# Patient Record
Sex: MALE | Race: BLACK OR AFRICAN AMERICAN | Employment: OTHER | URBAN - METROPOLITAN AREA
[De-identification: names, ages, dates, MRNs, and addresses within clinical notes are randomized per-mention and may not be internally consistent; named-entity substitution may affect disease eponyms.]

---

## 2018-09-25 NOTE — H&P (VIEW-ONLY)
Bertin Nowak 1960 Assessment: ICD-10-CM ICD-9-CM 1. Prostate cancer (HCC) C61 185 AMB POC URINALYSIS DIP STICK AUTO W/O MICRO 1. Intermediate Risk Prostate Cancer- [id number] Tommy 7 (4+3) in 7/12 cores involving 10-60% of each specimen - dx 6/15/18. Presenting PSA - 6.8 ng/mL Bone scan and CT A/P 7/18/18: Negative for any metastasis. TRUS Vol 22.6 grams. PLAN: 
Long discussion on post op RALP expectations in regards to incontinence and ED Discussed importance of performing pelvic floor physical therapy exercises post operatively, has appointment with PT today Rx for Revatio 20 mg with instructions to take two tabs (40 mg) each night for pre op and post op penile rehabilitation. All patient's questions were answered regarding upcoming surgery and post-op side effects. Follow up after surgery will consider FAY therapy at that time at 4 week bobby. DISCUSSION: 
Discussed SEs of prostatectomy, including difficulty with ED and worsening urinary symptoms. Discussed etiology of increased LUTS and ED. Treatment options of ED discussed to include: oral PDE5i meds, FAY, ICI, and IPP. Risks and benefits of each explained to patient. Discussed that medications and PFPT can help patient resolve LUTS 2/2 surgery. Chief Complaint Patient presents with  Prostate Cancer  Erectile Dysfunction HISTORY OF PRESENT ILLNESS: Bertin Nowak is a 60 y.o. male who presents today for recently diagnosed adenocarcinoma of the prostate. Patient here today with his wife to discuss SE of radical prostatectomy. EP of Dr. Akira Davidson. S/p TRUS bx 6/15/18 showing Med Grade [id number] Tommy 7 (4+3) in 7/12 cores involving 10-60% of each specimen. Presenting PSA 6.8ng/ml and TRUS Vol 22.6 grams. Patient was last seen in the office on 6/29/18 to review pathology.  In the interim, patient had staging Ct A/P Wo Cont & Nm whole body bone scan on 7/18/18. Both imaging studies were negative for any evidence for metastatic disease. Prior to prostatectomy: no bothersome urinary troubles, no heart attack/stroke, no new health concerns, HTN, mild ED. Not currently on any PDE5i's. Pt has seen PFPT in anticipation of future prostatectomy. Social: works at [employer] AUA-IPSS: 
AUA Symptom Score 9/25/2018 Over the past month how often have you had the sensation that your bladder was not completely empty after you finished urinating? 0 Over the past month, how often have had to urinate again less than 2 hours after you last finished urinating? 0 Over the past month, how often have you found you stopped and started again several times when you urinated? 0 Over the past month, how often have you found it difficult to postpone urination? 0 Over the past month, how often have you had a weak urinary stream? 0 Over the past month, how often have you had to push or strain to begin urinating? 0 Over the past month, how many times did you most typically get up to urinate from the time you went to bed at night until the time you got up in the morning? 2  
AUA Score 2 If you were to spend the rest of your life with your urinary condition the way it is now, how would you feel about that? Mostly satisfied AUA Assessment Score: 
AUA Score: 2;   
AUA Bother Rating: Mostly satisfied Past Medical History:  
Diagnosis Date  Arthritis  Eczema  ED (erectile dysfunction)  Elevated PSA  Essential hypertension  Gout  Prostate cancer ([facility] [address]  Renal colic History reviewed. No pertinent surgical history. Family History Problem Relation Age of Onset  Cancer Mother  Hypertension Sister  Cancer Sister  Hypertension Brother Current Outpatient Prescriptions Medication Sig Dispense Refill  sildenafil, antihypertensive, (REVATIO) 20 mg tablet Take 1-5 po as needed 90 Tab 6  amLODIPine (NORVASC) 10 mg tablet Take 10 mg by mouth daily.  cloNIDine HCl (CATAPRES) 0.1 mg tablet Take 0.1 mg by mouth two (2) times a day.  etodolac (LODINE) 400 mg tablet Take 400 mg by mouth two (2) times a day.  atenolol (TENORMIN) 50 mg tablet Take 50 mg by mouth daily.  aspirin 81 mg chewable tablet Take 81 mg by mouth daily.  colchicine (MITIGARE) 0.6 mg capsule Take one tablet by mouth when you return home. 1 Cap 0  
 HYDROcodone-acetaminophen (NORCO) 5-325 mg per tablet Take 1 Tab by mouth every four (4) hours as needed for Pain. Max Daily Amount: 6 Tabs. 6 Tab 0 Allergies: 
No Known Allergies Social History Social History  Marital status:  Spouse name: N/A  
 Number of children: N/A  
 Years of education: N/A Occupational History  Not on file. Social History Main Topics  Smoking status: Former Smoker Packs/day: 0.50 Years: 25.00 Quit date: 1/1/2013  Smokeless tobacco: Never Used  Alcohol use 9.6 oz/week 16 Glasses of wine per week  Drug use: No  
 Sexual activity: Not on file Other Topics Concern  Not on file Social History Narrative Review of Systems Constitutional: Fever: No 
Skin: Rash: No 
HEENT: Hearing difficulty: No 
Eyes: Blurred vision: No 
Cardiovascular: Chest pain: No 
Respiratory: Shortness of breath: No 
Gastrointestinal: Nausea/vomiting: No 
Musculoskeletal: Back pain: No 
Neurological: Weakness: No 
Psychological: Memory loss: No 
Comments/additional findings:  
 
 
PE: 
Visit Vitals  /80  Ht 5' 9\" (1.753 m)  Wt 184 lb (83.5 kg)  BMI 27.17 kg/m2 Constitutional: WDWN, Pleasant and appropriate affect, No acute distress. CV:  No peripheral swelling noted Respiratory: No respiratory distress or difficulties Skin: No jaundice. Neuro/Psych:  Alert and oriented x 3. Affect appropriate. Lymphatic:   No enlarged inguinal lymph nodes. Musc: No LE edema. REVIEW OF LABS AND IMAGING:   
 
Results for orders placed or performed in visit on 09/25/18 AMB POC URINALYSIS DIP STICK AUTO W/O MICRO Result Value Ref Range Color (UA POC) Yellow Clarity (UA POC) Clear Glucose (UA POC) Negative Negative Bilirubin (UA POC) 2+ Negative Ketones (UA POC) Negative Negative Specific gravity (UA POC) 1.020 1.001 - 1.035 Blood (UA POC) Negative Negative pH (UA POC) 6.0 4.6 - 8.0 Protein (UA POC) Negative Negative Urobilinogen (UA POC) 0.2 mg/dL 0.2 - 1 Nitrites (UA POC) Negative Negative Leukocyte esterase (UA POC) Negative Negative Lab Results Component Value Date/Time  
 Prostate Specific Ag 6.85 (H) 04/03/2018 09:53 AM  
 Prostate Specific Ag 4.49 (H) 07/28/2017 10:35 AM  
 
 
Prostate Bx Pathology 6/15/18 DIAGNOSIS: 
Right [place] A. Needle biopsy ADENOCARCINOMA, TOMMY SCORE 3 + 3 = 6 involving 10 % of the specimen (1 of 1 core(s) positive). Grade Group 1. Ends not involved by the tumor. B. Right Mid Needle biopsy Benign prostatic tissue. C. Right Base Needle biopsy Benign prostatic tissue. D. Right Lateral [place] Needle biopsy ATYPICAL SMALL ACINAR PROLIFERATION. E. Right Lateral Mid Needle biopsy ATYPICAL SMALL ACINAR PROLIFERATION. F. Right Lateral Base Needle biopsy Benign prostatic tissue. G. Left [place] Needle biopsy ADENOCARCINOMA, TOMMY SCORE 3 + 4 = 7 involving 30 % of the specimen, discontinuous (1 of 1 core(s) positive). [place] 4 comprises 5 % of the cancer. Grade Group 2. Ends not involved by the tumor. Perineural invasion. H. Left Mid Needle biopsy ADENOCARCINOMA, TOMMY SCORE 3 + 4 = 7 involving 30 % of the specimen, discontinuous (1 of 1 core(s) positive). [place] 4 comprises 20 % of the cancer. Grade Group 2. Ends not involved by the tumor. I. Left Base Needle biopsy ADENOCARCINOMA, TOMMY SCORE 4 + 3 = 7 involving 60 % of the specimen (1 of 1 core(s) positive). Tommy 4 comprises 70 % of the cancer. Grade Group 3. Ends not involved by the tumor. Perineural invasion. J. Left Lateral [place] Needle biopsy ADENOCARCINOMA, TOMMY SCORE 3 + 3 = 6 involving 10 % of the specimen (1 of 1 core(s) positive). Grade Group 1. Ends not involved by the tumor. K. Left Lateral Mid Needle biopsy ADENOCARCINOMA, TOMMY SCORE 4 + 3 = 7 involving 20 % of the specimen (1 of 1 core(s) positive). [place] 4 comprises 70 % of the cancer. Grade Group 3. Ends not involved by the tumor. L. Left Lateral Base Needle biopsy ADENOCARCINOMA, TOMMY SCORE 4 + 3 = 7 involving 60 % of the specimen, discontinuous (1 of 1 core(s) positive). [place] 4 comprises 70 % of the cancer. Grade Group 3. Ends not involved by the tumor. CT A/P W Cont 7/18/18 Kidneys: Negative. Lymph nodes: Not pathologic in size or number. CT PELVIS FINDINGS:  
Bladder: Negative. Osseous structures of the Abdomen and Pelvis: Normal for age. IMPRESSION Impression:  
There is no convincing CT evidence of metastatic disease. NM Bone Scan Wh Body 7/18/18 FINDINGS:  
Mild increased focal activity at the left maxillary region, possibly odontogenic, focal left maxillary sinusitis also conceivable. Mild increased activity at the right greater than left T10 costovertebral junctions, which in reference to CT corresponds to degenerative changes. There is moderately increased radiotracer activity at the left hip, corresponding to degenerative osteoarthrosis on CT. Sites of likely arthritic/degenerative activity at the bilateral costochondral junctions, right wrist, left hand, left knee, left ankle, and right greater than left first MTP joints. There is no focal abnormal radiopharmaceutical localization suspicious for osseous metastatic disease. There is expected soft tissue radiotracer activity in the kidneys and urinary bladder. 
  
IMPRESSION Impression: 
Findings as described above, without definite evidence of osseous metastatic disease. A copy of today's office visit with all pertinent imaging results and labs were sent to the referring MD Akira Landaverde. Jeff Henley MD  
Urology of [facility] 
[address] Medical documentation is provided with the assistance of Pura Olivo. Marychuy De Santiago medical scribe for Jaxon Correia MD on 9/25/2018  
 
30 min were spent in the patient's care, > 50% in counseling and coordination of care

## 2018-10-15 ENCOUNTER — HOSPITAL ENCOUNTER (OUTPATIENT)
Dept: PREADMISSION TESTING | Age: 58
Discharge: HOME | End: 2018-10-15
Payer: COMMERCIAL

## 2018-10-15 DIAGNOSIS — C61 MALIGNANT NEOPLASM OF PROSTATE (HCC): ICD-10-CM

## 2018-10-15 LAB
ABO + RH BLD: NORMAL
ANION GAP SERPL CALC-SCNC: 8 MMOL/L (ref 3–18)
APPEARANCE UR: CLEAR
APTT PPP: 31.8 SEC (ref 23–36.4)
ATRIAL RATE: 64 BPM
BASOPHILS # BLD: 0.1 K/UL (ref 0–0.1)
BASOPHILS NFR BLD: 1 % (ref 0–2)
BILIRUB UR QL: NEGATIVE
BLOOD GROUP ANTIBODIES SERPL: NORMAL
BUN SERPL-MCNC: 12 MG/DL (ref 7–18)
BUN/CREAT SERPL: 13 (ref 12–20)
CALCIUM SERPL-MCNC: 8.7 MG/DL (ref 8.5–10.1)
CALCULATED P AXIS, ECG09: 59 DEGREES
CALCULATED R AXIS, ECG10: 45 DEGREES
CALCULATED T AXIS, ECG11: 43 DEGREES
CHLORIDE SERPL-SCNC: 103 MMOL/L (ref 100–108)
CO2 SERPL-SCNC: 29 MMOL/L (ref 21–32)
COLOR UR: YELLOW
CREAT SERPL-MCNC: 0.91 MG/DL (ref 0.6–1.3)
DIAGNOSIS, 93000: NORMAL
DIFFERENTIAL METHOD BLD: ABNORMAL
EOSINOPHIL # BLD: 0.1 K/UL (ref 0–0.4)
EOSINOPHIL NFR BLD: 1 % (ref 0–5)
ERYTHROCYTE [DISTWIDTH] IN BLOOD BY AUTOMATED COUNT: 13.7 % (ref 11.6–14.5)
GLUCOSE SERPL-MCNC: 92 MG/DL (ref 74–99)
GLUCOSE UR STRIP.AUTO-MCNC: NEGATIVE MG/DL
HCT VFR BLD AUTO: 40.4 % (ref 36–48)
HGB BLD-MCNC: 13.9 G/DL (ref 13–16)
HGB UR QL STRIP: NEGATIVE
INR PPP: 1 (ref 0.8–1.2)
KETONES UR QL STRIP.AUTO: NEGATIVE MG/DL
LEUKOCYTE ESTERASE UR QL STRIP.AUTO: NEGATIVE
LYMPHOCYTES # BLD: 1.5 K/UL (ref 0.9–3.6)
LYMPHOCYTES NFR BLD: 29 % (ref 21–52)
MCH RBC QN AUTO: 30.5 PG (ref 24–34)
MCHC RBC AUTO-ENTMCNC: 34.4 G/DL (ref 31–37)
MCV RBC AUTO: 88.6 FL (ref 74–97)
MONOCYTES # BLD: 0.3 K/UL (ref 0.05–1.2)
MONOCYTES NFR BLD: 5 % (ref 3–10)
NEUTS SEG # BLD: 3.3 K/UL (ref 1.8–8)
NEUTS SEG NFR BLD: 64 % (ref 40–73)
NITRITE UR QL STRIP.AUTO: NEGATIVE
P-R INTERVAL, ECG05: 164 MS
PH UR STRIP: 6 [PH] (ref 5–8)
PLATELET # BLD AUTO: 284 K/UL (ref 135–420)
PMV BLD AUTO: 9.6 FL (ref 9.2–11.8)
POTASSIUM SERPL-SCNC: 3.8 MMOL/L (ref 3.5–5.5)
PROT UR STRIP-MCNC: NEGATIVE MG/DL
PROTHROMBIN TIME: 13.2 SEC (ref 11.5–15.2)
Q-T INTERVAL, ECG07: 416 MS
QRS DURATION, ECG06: 92 MS
QTC CALCULATION (BEZET), ECG08: 429 MS
RBC # BLD AUTO: 4.56 M/UL (ref 4.7–5.5)
SODIUM SERPL-SCNC: 140 MMOL/L (ref 136–145)
SP GR UR REFRACTOMETRY: 1.01 (ref 1–1.03)
SPECIMEN EXP DATE BLD: NORMAL
UROBILINOGEN UR QL STRIP.AUTO: 0.2 EU/DL (ref 0.2–1)
VENTRICULAR RATE, ECG03: 64 BPM
WBC # BLD AUTO: 5.2 K/UL (ref 4.6–13.2)

## 2018-10-15 PROCEDURE — 80048 BASIC METABOLIC PNL TOTAL CA: CPT | Performed by: UROLOGY

## 2018-10-15 PROCEDURE — 85610 PROTHROMBIN TIME: CPT | Performed by: UROLOGY

## 2018-10-15 PROCEDURE — 85025 COMPLETE CBC W/AUTO DIFF WBC: CPT | Performed by: UROLOGY

## 2018-10-15 PROCEDURE — 93005 ELECTROCARDIOGRAM TRACING: CPT

## 2018-10-15 PROCEDURE — 87086 URINE CULTURE/COLONY COUNT: CPT | Performed by: UROLOGY

## 2018-10-15 PROCEDURE — 85730 THROMBOPLASTIN TIME PARTIAL: CPT | Performed by: UROLOGY

## 2018-10-15 PROCEDURE — 81003 URINALYSIS AUTO W/O SCOPE: CPT | Performed by: UROLOGY

## 2018-10-15 PROCEDURE — 86900 BLOOD TYPING SEROLOGIC ABO: CPT | Performed by: UROLOGY

## 2018-10-15 PROCEDURE — 36415 COLL VENOUS BLD VENIPUNCTURE: CPT | Performed by: UROLOGY

## 2018-10-16 LAB
BACTERIA SPEC CULT: NORMAL
SERVICE CMNT-IMP: NORMAL

## 2018-10-23 RX ORDER — CEFAZOLIN SODIUM 2 G/50ML
2 SOLUTION INTRAVENOUS ONCE
Status: CANCELLED | OUTPATIENT
Start: 2018-10-25 | End: 2018-10-25

## 2018-10-23 RX ORDER — HEPARIN SODIUM 5000 [USP'U]/ML
5000 INJECTION, SOLUTION INTRAVENOUS; SUBCUTANEOUS ONCE
Status: CANCELLED | OUTPATIENT
Start: 2018-10-25 | End: 2018-10-25

## 2018-10-24 ENCOUNTER — ANESTHESIA EVENT (OUTPATIENT)
Dept: SURGERY | Age: 58
DRG: 707 | End: 2018-10-24
Payer: COMMERCIAL

## 2018-10-25 ENCOUNTER — ANESTHESIA (OUTPATIENT)
Dept: SURGERY | Age: 58
DRG: 707 | End: 2018-10-25
Payer: COMMERCIAL

## 2018-10-25 ENCOUNTER — HOSPITAL ENCOUNTER (INPATIENT)
Age: 58
LOS: 4 days | Discharge: HOME | DRG: 707 | End: 2018-10-29
Attending: UROLOGY | Admitting: UROLOGY
Payer: COMMERCIAL

## 2018-10-25 DIAGNOSIS — C61 MALIGNANT NEOPLASM OF PROSTATE (HCC): Primary | ICD-10-CM

## 2018-10-25 PROCEDURE — 77030014647 HC SEAL FBRN TISSL BAXT -D: Performed by: UROLOGY

## 2018-10-25 PROCEDURE — 77030027138 HC INCENT SPIROMETER -A

## 2018-10-25 PROCEDURE — 77030010939 HC CLP LIG TELE -B: Performed by: UROLOGY

## 2018-10-25 PROCEDURE — 76010000880 HC OR TIME 4 TO 4.5HR INTENSV - TIER 2: Performed by: UROLOGY

## 2018-10-25 PROCEDURE — 74011250637 HC RX REV CODE- 250/637: Performed by: UROLOGY

## 2018-10-25 PROCEDURE — 0VT34ZZ RESECTION OF BILATERAL SEMINAL VESICLES, PERCUTANEOUS ENDOSCOPIC APPROACH: ICD-10-PCS | Performed by: UROLOGY

## 2018-10-25 PROCEDURE — 74011250636 HC RX REV CODE- 250/636: Performed by: NURSE ANESTHETIST, CERTIFIED REGISTERED

## 2018-10-25 PROCEDURE — 74011000250 HC RX REV CODE- 250: Performed by: UROLOGY

## 2018-10-25 PROCEDURE — 74011000272 HC RX REV CODE- 272: Performed by: UROLOGY

## 2018-10-25 PROCEDURE — 0VT04ZZ RESECTION OF PROSTATE, PERCUTANEOUS ENDOSCOPIC APPROACH: ICD-10-PCS | Performed by: UROLOGY

## 2018-10-25 PROCEDURE — 65270000029 HC RM PRIVATE

## 2018-10-25 PROCEDURE — 74011000250 HC RX REV CODE- 250

## 2018-10-25 PROCEDURE — 77030008756 HC TU IRR SUC STRY -B: Performed by: UROLOGY

## 2018-10-25 PROCEDURE — 0VTQ4ZZ RESECTION OF BILATERAL VAS DEFERENS, PERCUTANEOUS ENDOSCOPIC APPROACH: ICD-10-PCS | Performed by: UROLOGY

## 2018-10-25 PROCEDURE — 07BC4ZX EXCISION OF PELVIS LYMPHATIC, PERCUTANEOUS ENDOSCOPIC APPROACH, DIAGNOSTIC: ICD-10-PCS | Performed by: UROLOGY

## 2018-10-25 PROCEDURE — 77030013079 HC BLNKT BAIR HGGR 3M -A: Performed by: ANESTHESIOLOGY

## 2018-10-25 PROCEDURE — 77030008683 HC TU ET CUF COVD -A: Performed by: ANESTHESIOLOGY

## 2018-10-25 PROCEDURE — 74011250636 HC RX REV CODE- 250/636

## 2018-10-25 PROCEDURE — 74011250636 HC RX REV CODE- 250/636: Performed by: UROLOGY

## 2018-10-25 PROCEDURE — 77030020263 HC SOL INJ SOD CL0.9% LFCR 1000ML: Performed by: UROLOGY

## 2018-10-25 PROCEDURE — 77030034696 HC CATH URETH FOL 2W BARD -A: Performed by: UROLOGY

## 2018-10-25 PROCEDURE — 77030037892: Performed by: UROLOGY

## 2018-10-25 PROCEDURE — 77030034850: Performed by: UROLOGY

## 2018-10-25 PROCEDURE — 88307 TISSUE EXAM BY PATHOLOGIST: CPT | Performed by: UROLOGY

## 2018-10-25 PROCEDURE — 77030018846 HC SOL IRR STRL H20 ICUM -A: Performed by: UROLOGY

## 2018-10-25 PROCEDURE — 77030032490 HC SLV COMPR SCD KNE COVD -B: Performed by: UROLOGY

## 2018-10-25 PROCEDURE — 99218 HC RM OBSERVATION: CPT

## 2018-10-25 PROCEDURE — 77030039266 HC ADH SKN EXOFIN S2SG -A: Performed by: UROLOGY

## 2018-10-25 PROCEDURE — 77030016151 HC PROTCTR LNS DFOG COVD -B: Performed by: UROLOGY

## 2018-10-25 PROCEDURE — 77030022704 HC SUT VLOC COVD -B: Performed by: UROLOGY

## 2018-10-25 PROCEDURE — 77030026438 HC STYL ET INTUB CARD -A: Performed by: ANESTHESIOLOGY

## 2018-10-25 PROCEDURE — 76210000006 HC OR PH I REC 0.5 TO 1 HR: Performed by: UROLOGY

## 2018-10-25 PROCEDURE — 88309 TISSUE EXAM BY PATHOLOGIST: CPT | Performed by: UROLOGY

## 2018-10-25 PROCEDURE — 77030009848 HC PASSR SUT SET COOP -C: Performed by: UROLOGY

## 2018-10-25 PROCEDURE — 77030002933 HC SUT MCRYL J&J -A: Performed by: UROLOGY

## 2018-10-25 PROCEDURE — 77030031139 HC SUT VCRL2 J&J -A: Performed by: UROLOGY

## 2018-10-25 PROCEDURE — 77030019605: Performed by: UROLOGY

## 2018-10-25 PROCEDURE — 77030008771 HC TU NG SALEM SUMP -A: Performed by: ANESTHESIOLOGY

## 2018-10-25 PROCEDURE — 77030020703 HC SEAL CANN DISP INTU -B: Performed by: UROLOGY

## 2018-10-25 PROCEDURE — 77030010935 HC CLP LIG ABSRB TELE -B: Performed by: UROLOGY

## 2018-10-25 PROCEDURE — 77030020788: Performed by: UROLOGY

## 2018-10-25 PROCEDURE — 74011250637 HC RX REV CODE- 250/637: Performed by: NURSE ANESTHETIST, CERTIFIED REGISTERED

## 2018-10-25 PROCEDURE — 77030010517 HC APPL SEAL FLOSEL BAXT -B: Performed by: UROLOGY

## 2018-10-25 PROCEDURE — 76060000039 HC ANESTHESIA 4 TO 4.5 HR: Performed by: UROLOGY

## 2018-10-25 PROCEDURE — 8E0W4CZ ROBOTIC ASSISTED PROCEDURE OF TRUNK REGION, PERCUTANEOUS ENDOSCOPIC APPROACH: ICD-10-PCS | Performed by: UROLOGY

## 2018-10-25 PROCEDURE — 77030035048 HC TRCR ENDOSC OPTCL COVD -B: Performed by: UROLOGY

## 2018-10-25 PROCEDURE — 77030003028 HC SUT VCRL J&J -A: Performed by: UROLOGY

## 2018-10-25 RX ORDER — DOCUSATE SODIUM 100 MG/1
100 CAPSULE, LIQUID FILLED ORAL 2 TIMES DAILY
Qty: 40 CAP | Refills: 0 | Status: SHIPPED | OUTPATIENT
Start: 2018-10-25 | End: 2018-11-14

## 2018-10-25 RX ORDER — GLYCOPYRROLATE 0.2 MG/ML
INJECTION INTRAMUSCULAR; INTRAVENOUS AS NEEDED
Status: DISCONTINUED | OUTPATIENT
Start: 2018-10-25 | End: 2018-10-25 | Stop reason: HOSPADM

## 2018-10-25 RX ORDER — CLONIDINE HYDROCHLORIDE 0.1 MG/1
0.1 TABLET ORAL 2 TIMES DAILY
Status: DISCONTINUED | OUTPATIENT
Start: 2018-10-25 | End: 2018-10-29 | Stop reason: HOSPADM

## 2018-10-25 RX ORDER — SODIUM CHLORIDE 0.9 % (FLUSH) 0.9 %
5-10 SYRINGE (ML) INJECTION EVERY 8 HOURS
Status: DISCONTINUED | OUTPATIENT
Start: 2018-10-25 | End: 2018-10-29 | Stop reason: HOSPADM

## 2018-10-25 RX ORDER — FAMOTIDINE 20 MG/1
20 TABLET, FILM COATED ORAL ONCE
Status: COMPLETED | OUTPATIENT
Start: 2018-10-25 | End: 2018-10-25

## 2018-10-25 RX ORDER — EPHEDRINE SULFATE 50 MG/ML
INJECTION, SOLUTION INTRAVENOUS AS NEEDED
Status: DISCONTINUED | OUTPATIENT
Start: 2018-10-25 | End: 2018-10-25 | Stop reason: HOSPADM

## 2018-10-25 RX ORDER — NALOXONE HYDROCHLORIDE 0.4 MG/ML
0.1 INJECTION, SOLUTION INTRAMUSCULAR; INTRAVENOUS; SUBCUTANEOUS ONCE
Status: DISCONTINUED | OUTPATIENT
Start: 2018-10-25 | End: 2018-10-25 | Stop reason: HOSPADM

## 2018-10-25 RX ORDER — BUPIVACAINE HYDROCHLORIDE 2.5 MG/ML
INJECTION, SOLUTION EPIDURAL; INFILTRATION; INTRACAUDAL AS NEEDED
Status: DISCONTINUED | OUTPATIENT
Start: 2018-10-25 | End: 2018-10-25 | Stop reason: HOSPADM

## 2018-10-25 RX ORDER — LIDOCAINE HYDROCHLORIDE 20 MG/ML
INJECTION, SOLUTION EPIDURAL; INFILTRATION; INTRACAUDAL; PERINEURAL AS NEEDED
Status: DISCONTINUED | OUTPATIENT
Start: 2018-10-25 | End: 2018-10-25 | Stop reason: HOSPADM

## 2018-10-25 RX ORDER — ENOXAPARIN SODIUM 100 MG/ML
40 INJECTION SUBCUTANEOUS DAILY
Qty: 26 SYRINGE | Refills: 0 | Status: ON HOLD | OUTPATIENT
Start: 2018-10-25 | End: 2021-09-15 | Stop reason: CLARIF

## 2018-10-25 RX ORDER — SODIUM CHLORIDE 0.9 % (FLUSH) 0.9 %
5-10 SYRINGE (ML) INJECTION AS NEEDED
Status: DISCONTINUED | OUTPATIENT
Start: 2018-10-25 | End: 2018-10-29 | Stop reason: HOSPADM

## 2018-10-25 RX ORDER — AMLODIPINE BESYLATE 10 MG/1
10 TABLET ORAL DAILY
Status: DISCONTINUED | OUTPATIENT
Start: 2018-10-26 | End: 2018-10-29 | Stop reason: HOSPADM

## 2018-10-25 RX ORDER — ONDANSETRON 2 MG/ML
INJECTION INTRAMUSCULAR; INTRAVENOUS AS NEEDED
Status: DISCONTINUED | OUTPATIENT
Start: 2018-10-25 | End: 2018-10-25 | Stop reason: HOSPADM

## 2018-10-25 RX ORDER — SODIUM CHLORIDE 0.9 % (FLUSH) 0.9 %
5-10 SYRINGE (ML) INJECTION AS NEEDED
Status: CANCELLED | OUTPATIENT
Start: 2018-10-25

## 2018-10-25 RX ORDER — SODIUM CHLORIDE, SODIUM LACTATE, POTASSIUM CHLORIDE, CALCIUM CHLORIDE 600; 310; 30; 20 MG/100ML; MG/100ML; MG/100ML; MG/100ML
75 INJECTION, SOLUTION INTRAVENOUS CONTINUOUS
Status: DISCONTINUED | OUTPATIENT
Start: 2018-10-25 | End: 2018-10-25 | Stop reason: HOSPADM

## 2018-10-25 RX ORDER — SODIUM CHLORIDE 9 MG/ML
INJECTION, SOLUTION INTRAVENOUS
Status: DISCONTINUED | OUTPATIENT
Start: 2018-10-25 | End: 2018-10-25 | Stop reason: HOSPADM

## 2018-10-25 RX ORDER — ONDANSETRON 2 MG/ML
4 INJECTION INTRAMUSCULAR; INTRAVENOUS
Status: DISCONTINUED | OUTPATIENT
Start: 2018-10-25 | End: 2018-10-29 | Stop reason: HOSPADM

## 2018-10-25 RX ORDER — PROPOFOL 10 MG/ML
INJECTION, EMULSION INTRAVENOUS AS NEEDED
Status: DISCONTINUED | OUTPATIENT
Start: 2018-10-25 | End: 2018-10-25 | Stop reason: HOSPADM

## 2018-10-25 RX ORDER — FENTANYL CITRATE 50 UG/ML
INJECTION, SOLUTION INTRAMUSCULAR; INTRAVENOUS AS NEEDED
Status: DISCONTINUED | OUTPATIENT
Start: 2018-10-25 | End: 2018-10-25 | Stop reason: HOSPADM

## 2018-10-25 RX ORDER — ACETAMINOPHEN 325 MG/1
650 TABLET ORAL
Status: DISCONTINUED | OUTPATIENT
Start: 2018-10-25 | End: 2018-10-29 | Stop reason: HOSPADM

## 2018-10-25 RX ORDER — HYDROMORPHONE HYDROCHLORIDE 2 MG/ML
0.5 INJECTION, SOLUTION INTRAMUSCULAR; INTRAVENOUS; SUBCUTANEOUS AS NEEDED
Status: COMPLETED | OUTPATIENT
Start: 2018-10-25 | End: 2018-10-25

## 2018-10-25 RX ORDER — MAGNESIUM SULFATE 100 %
4 CRYSTALS MISCELLANEOUS AS NEEDED
Status: CANCELLED | OUTPATIENT
Start: 2018-10-25

## 2018-10-25 RX ORDER — SODIUM CHLORIDE 9 MG/ML
125 INJECTION, SOLUTION INTRAVENOUS CONTINUOUS
Status: DISCONTINUED | OUTPATIENT
Start: 2018-10-25 | End: 2018-10-26

## 2018-10-25 RX ORDER — GUAIFENESIN 100 MG/5ML
81 LIQUID (ML) ORAL DAILY
Status: DISCONTINUED | OUTPATIENT
Start: 2018-10-26 | End: 2018-10-29 | Stop reason: HOSPADM

## 2018-10-25 RX ORDER — NALOXONE HYDROCHLORIDE 0.4 MG/ML
INJECTION, SOLUTION INTRAMUSCULAR; INTRAVENOUS; SUBCUTANEOUS AS NEEDED
Status: CANCELLED | OUTPATIENT
Start: 2018-10-25

## 2018-10-25 RX ORDER — SODIUM CHLORIDE, SODIUM LACTATE, POTASSIUM CHLORIDE, CALCIUM CHLORIDE 600; 310; 30; 20 MG/100ML; MG/100ML; MG/100ML; MG/100ML
50 INJECTION, SOLUTION INTRAVENOUS CONTINUOUS
Status: DISPENSED | OUTPATIENT
Start: 2018-10-25 | End: 2018-10-26

## 2018-10-25 RX ORDER — OXYCODONE AND ACETAMINOPHEN 5; 325 MG/1; MG/1
1-2 TABLET ORAL
Qty: 20 TAB | Refills: 0 | Status: SHIPPED | OUTPATIENT
Start: 2018-10-25 | End: 2018-12-04

## 2018-10-25 RX ORDER — NEOSTIGMINE METHYLSULFATE 5 MG/5 ML
SYRINGE (ML) INTRAVENOUS AS NEEDED
Status: DISCONTINUED | OUTPATIENT
Start: 2018-10-25 | End: 2018-10-25 | Stop reason: HOSPADM

## 2018-10-25 RX ORDER — HEPARIN SODIUM 5000 [USP'U]/ML
5000 INJECTION, SOLUTION INTRAVENOUS; SUBCUTANEOUS EVERY 8 HOURS
Status: DISCONTINUED | OUTPATIENT
Start: 2018-10-25 | End: 2018-10-29 | Stop reason: HOSPADM

## 2018-10-25 RX ORDER — MIDAZOLAM HYDROCHLORIDE 1 MG/ML
INJECTION, SOLUTION INTRAMUSCULAR; INTRAVENOUS AS NEEDED
Status: DISCONTINUED | OUTPATIENT
Start: 2018-10-25 | End: 2018-10-25 | Stop reason: HOSPADM

## 2018-10-25 RX ORDER — OXYCODONE AND ACETAMINOPHEN 5; 325 MG/1; MG/1
1 TABLET ORAL
Status: DISCONTINUED | OUTPATIENT
Start: 2018-10-25 | End: 2018-10-29 | Stop reason: HOSPADM

## 2018-10-25 RX ORDER — OXYBUTYNIN CHLORIDE 5 MG/1
5 TABLET ORAL
Status: DISCONTINUED | OUTPATIENT
Start: 2018-10-25 | End: 2018-10-29 | Stop reason: HOSPADM

## 2018-10-25 RX ORDER — ZOLPIDEM TARTRATE 5 MG/1
5 TABLET ORAL
Status: DISCONTINUED | OUTPATIENT
Start: 2018-10-25 | End: 2018-10-29 | Stop reason: HOSPADM

## 2018-10-25 RX ORDER — CEFAZOLIN SODIUM 2 G/50ML
2 SOLUTION INTRAVENOUS ONCE
Status: COMPLETED | OUTPATIENT
Start: 2018-10-25 | End: 2018-10-25

## 2018-10-25 RX ORDER — DEXTROSE 50 % IN WATER (D50W) INTRAVENOUS SYRINGE
25-50 AS NEEDED
Status: CANCELLED | OUTPATIENT
Start: 2018-10-25

## 2018-10-25 RX ORDER — SODIUM CHLORIDE, SODIUM LACTATE, POTASSIUM CHLORIDE, CALCIUM CHLORIDE 600; 310; 30; 20 MG/100ML; MG/100ML; MG/100ML; MG/100ML
INJECTION, SOLUTION INTRAVENOUS CONTINUOUS
Status: CANCELLED | OUTPATIENT
Start: 2018-10-25

## 2018-10-25 RX ORDER — HEPARIN SODIUM 5000 [USP'U]/ML
5000 INJECTION, SOLUTION INTRAVENOUS; SUBCUTANEOUS ONCE
Status: COMPLETED | OUTPATIENT
Start: 2018-10-25 | End: 2018-10-25

## 2018-10-25 RX ORDER — SODIUM CHLORIDE 0.9 % (FLUSH) 0.9 %
5-10 SYRINGE (ML) INJECTION EVERY 8 HOURS
Status: CANCELLED | OUTPATIENT
Start: 2018-10-25

## 2018-10-25 RX ORDER — DEXAMETHASONE SODIUM PHOSPHATE 4 MG/ML
INJECTION, SOLUTION INTRA-ARTICULAR; INTRALESIONAL; INTRAMUSCULAR; INTRAVENOUS; SOFT TISSUE AS NEEDED
Status: DISCONTINUED | OUTPATIENT
Start: 2018-10-25 | End: 2018-10-25 | Stop reason: HOSPADM

## 2018-10-25 RX ORDER — VECURONIUM BROMIDE FOR INJECTION 1 MG/ML
INJECTION, POWDER, LYOPHILIZED, FOR SOLUTION INTRAVENOUS AS NEEDED
Status: DISCONTINUED | OUTPATIENT
Start: 2018-10-25 | End: 2018-10-25 | Stop reason: HOSPADM

## 2018-10-25 RX ORDER — INSULIN LISPRO 100 [IU]/ML
INJECTION, SOLUTION INTRAVENOUS; SUBCUTANEOUS
Status: CANCELLED | OUTPATIENT
Start: 2018-10-25

## 2018-10-25 RX ORDER — DOCUSATE SODIUM 100 MG/1
100 CAPSULE, LIQUID FILLED ORAL 2 TIMES DAILY
Status: DISCONTINUED | OUTPATIENT
Start: 2018-10-25 | End: 2018-10-29 | Stop reason: HOSPADM

## 2018-10-25 RX ORDER — HYDROMORPHONE HYDROCHLORIDE 1 MG/ML
0.4 INJECTION, SOLUTION INTRAMUSCULAR; INTRAVENOUS; SUBCUTANEOUS
Status: DISCONTINUED | OUTPATIENT
Start: 2018-10-25 | End: 2018-10-29 | Stop reason: HOSPADM

## 2018-10-25 RX ORDER — INSULIN LISPRO 100 [IU]/ML
INJECTION, SOLUTION INTRAVENOUS; SUBCUTANEOUS ONCE
Status: ACTIVE | OUTPATIENT
Start: 2018-10-25 | End: 2018-10-25

## 2018-10-25 RX ORDER — ATENOLOL 50 MG/1
50 TABLET ORAL DAILY
Status: DISCONTINUED | OUTPATIENT
Start: 2018-10-26 | End: 2018-10-29 | Stop reason: HOSPADM

## 2018-10-25 RX ORDER — SUCCINYLCHOLINE CHLORIDE 20 MG/ML
INJECTION INTRAMUSCULAR; INTRAVENOUS AS NEEDED
Status: DISCONTINUED | OUTPATIENT
Start: 2018-10-25 | End: 2018-10-25 | Stop reason: HOSPADM

## 2018-10-25 RX ADMIN — FAMOTIDINE 20 MG: 20 TABLET, FILM COATED ORAL at 06:33

## 2018-10-25 RX ADMIN — HEPARIN SODIUM 5000 UNITS: 5000 INJECTION, SOLUTION INTRAVENOUS; SUBCUTANEOUS at 14:22

## 2018-10-25 RX ADMIN — HYDROMORPHONE HYDROCHLORIDE 0.4 MG: 1 INJECTION, SOLUTION INTRAMUSCULAR; INTRAVENOUS; SUBCUTANEOUS at 14:23

## 2018-10-25 RX ADMIN — VECURONIUM BROMIDE FOR INJECTION 5 MG: 1 INJECTION, POWDER, LYOPHILIZED, FOR SOLUTION INTRAVENOUS at 08:05

## 2018-10-25 RX ADMIN — SODIUM CHLORIDE 125 ML/HR: 900 INJECTION, SOLUTION INTRAVENOUS at 15:27

## 2018-10-25 RX ADMIN — FENTANYL CITRATE 100 MCG: 50 INJECTION, SOLUTION INTRAMUSCULAR; INTRAVENOUS at 07:52

## 2018-10-25 RX ADMIN — PROPOFOL 50 MG: 10 INJECTION, EMULSION INTRAVENOUS at 07:55

## 2018-10-25 RX ADMIN — PROPOFOL 150 MG: 10 INJECTION, EMULSION INTRAVENOUS at 07:53

## 2018-10-25 RX ADMIN — VECURONIUM BROMIDE FOR INJECTION 1 MG: 1 INJECTION, POWDER, LYOPHILIZED, FOR SOLUTION INTRAVENOUS at 10:56

## 2018-10-25 RX ADMIN — EPHEDRINE SULFATE 5 MG: 50 INJECTION, SOLUTION INTRAVENOUS at 10:39

## 2018-10-25 RX ADMIN — SODIUM CHLORIDE, SODIUM LACTATE, POTASSIUM CHLORIDE, AND CALCIUM CHLORIDE 50 ML/HR: 600; 310; 30; 20 INJECTION, SOLUTION INTRAVENOUS at 06:33

## 2018-10-25 RX ADMIN — ONDANSETRON 4 MG: 2 INJECTION INTRAMUSCULAR; INTRAVENOUS at 11:45

## 2018-10-25 RX ADMIN — SODIUM CHLORIDE 125 ML/HR: 900 INJECTION, SOLUTION INTRAVENOUS at 21:05

## 2018-10-25 RX ADMIN — MIDAZOLAM HYDROCHLORIDE 2 MG: 1 INJECTION, SOLUTION INTRAMUSCULAR; INTRAVENOUS at 07:45

## 2018-10-25 RX ADMIN — Medication 10 ML: at 17:39

## 2018-10-25 RX ADMIN — HEPARIN SODIUM 5000 UNITS: 5000 INJECTION, SOLUTION INTRAVENOUS; SUBCUTANEOUS at 06:50

## 2018-10-25 RX ADMIN — VECURONIUM BROMIDE FOR INJECTION 1 MG: 1 INJECTION, POWDER, LYOPHILIZED, FOR SOLUTION INTRAVENOUS at 10:16

## 2018-10-25 RX ADMIN — HYDROMORPHONE HYDROCHLORIDE 0.4 MG: 1 INJECTION, SOLUTION INTRAMUSCULAR; INTRAVENOUS; SUBCUTANEOUS at 18:13

## 2018-10-25 RX ADMIN — HYDROMORPHONE HYDROCHLORIDE 0.5 MG: 2 INJECTION INTRAMUSCULAR; INTRAVENOUS; SUBCUTANEOUS at 12:31

## 2018-10-25 RX ADMIN — VECURONIUM BROMIDE FOR INJECTION 1 MG: 1 INJECTION, POWDER, LYOPHILIZED, FOR SOLUTION INTRAVENOUS at 07:50

## 2018-10-25 RX ADMIN — HYDROMORPHONE HYDROCHLORIDE 0.5 MG: 2 INJECTION INTRAMUSCULAR; INTRAVENOUS; SUBCUTANEOUS at 12:41

## 2018-10-25 RX ADMIN — SODIUM CHLORIDE: 9 INJECTION, SOLUTION INTRAVENOUS at 08:00

## 2018-10-25 RX ADMIN — HEPARIN SODIUM 5000 UNITS: 5000 INJECTION, SOLUTION INTRAVENOUS; SUBCUTANEOUS at 21:07

## 2018-10-25 RX ADMIN — SUCCINYLCHOLINE CHLORIDE 100 MG: 20 INJECTION INTRAMUSCULAR; INTRAVENOUS at 07:54

## 2018-10-25 RX ADMIN — CEFAZOLIN 1 G: 1 INJECTION, POWDER, FOR SOLUTION INTRAMUSCULAR; INTRAVENOUS at 15:26

## 2018-10-25 RX ADMIN — HYDROMORPHONE HYDROCHLORIDE 0.5 MG: 2 INJECTION INTRAMUSCULAR; INTRAVENOUS; SUBCUTANEOUS at 12:36

## 2018-10-25 RX ADMIN — GLYCOPYRROLATE 0.6 MG: 0.2 INJECTION INTRAMUSCULAR; INTRAVENOUS at 11:52

## 2018-10-25 RX ADMIN — HYDROMORPHONE HYDROCHLORIDE 0.5 MG: 2 INJECTION INTRAMUSCULAR; INTRAVENOUS; SUBCUTANEOUS at 12:26

## 2018-10-25 RX ADMIN — LIDOCAINE HYDROCHLORIDE 60 MG: 20 INJECTION, SOLUTION EPIDURAL; INFILTRATION; INTRACAUDAL; PERINEURAL at 07:52

## 2018-10-25 RX ADMIN — DEXAMETHASONE SODIUM PHOSPHATE 8 MG: 4 INJECTION, SOLUTION INTRA-ARTICULAR; INTRALESIONAL; INTRAMUSCULAR; INTRAVENOUS; SOFT TISSUE at 08:45

## 2018-10-25 RX ADMIN — EPHEDRINE SULFATE 5 MG: 50 INJECTION, SOLUTION INTRAVENOUS at 11:40

## 2018-10-25 RX ADMIN — CEFAZOLIN SODIUM 2 G: 2 SOLUTION INTRAVENOUS at 08:14

## 2018-10-25 RX ADMIN — DOCUSATE SODIUM 100 MG: 100 CAPSULE, LIQUID FILLED ORAL at 17:38

## 2018-10-25 RX ADMIN — Medication 3 MG: at 11:52

## 2018-10-25 RX ADMIN — CLONIDINE HYDROCHLORIDE 0.1 MG: 0.1 TABLET ORAL at 17:38

## 2018-10-25 RX ADMIN — Medication 10 ML: at 21:07

## 2018-10-25 RX ADMIN — HYDROMORPHONE HYDROCHLORIDE 0.4 MG: 1 INJECTION, SOLUTION INTRAMUSCULAR; INTRAVENOUS; SUBCUTANEOUS at 22:27

## 2018-10-25 RX ADMIN — CEFAZOLIN 1 G: 1 INJECTION, POWDER, FOR SOLUTION INTRAMUSCULAR; INTRAVENOUS at 23:59

## 2018-10-25 NOTE — OP NOTES
Operative Report    Patient Name:  Tracey Schaffer      PREOPERATIVE DIAGNOSIS:  Prostate cancer. POSTOPERATIVE DIAGNOSIS:  Prostate cancer. OPERATION PERFORMED:  1. Robotic-assisted laparoscopic radical prostatectomy, right complete nerve sparing, left partial nerve sparing. 2. Robotic-assisted laparoscopic bilateral pelvic lymph node dissection. 3. Anterior Bladder neck reconstruction    SURGEON:  Felice Becker M.D.      ASSISTANT:  Carlos A Treviño MD      ANESTHESIA:  GETA      ESTIMATED BLOOD LOSS:  062 ml      COMPLICATIONS:  None. INDICATIONS FOR OPERATION:  This is a 60 y.o.  male with a history of clinical T1c prostate cancer who presents for a robotic assisted radical prostatectomy. FINDINGS:  1. Approximately 45 gram prostate with no obvious extracapsular extension. 2. Right complete nerve-sparing, left partial nerve sparing procedure was performed. 3. Grossly normal bilateral pelvic lymph node packets. SPECIMENS:   ID Type Source Tests Collected by Time Destination   1 : prostate Preservative Prostate  Link MD Ani 10/25/2018 11:47 AM Pathology   2 : lymph nodes #1 Preservative Lymph Node  Link MD Ani 10/25/2018 11:47 AM Pathology   3 : lymph nodes #2 Preservative Lymph Node  Link MD Ani 10/25/2018 11:48 AM Pathology       DRAINS:  18 Fr Hager catheter. DESCRIPTION OF OPERATION:  The patient was identified and informed consent was verified. The patient was taken to the operating room and placed supine on the operating table. SCDs were confirmed to be on and functioning. Preoperative antibiotics were given. After induction of general anesthesia, the patient was placed in a low lithotomy position with his arms carefully padded and tucked at his sides. All pressure points were carefully and meticulously padded. The patient was then prepped and draped in the standard sterile fashion.     A Hager catheter was placed to drain the bladder. A supraumbilical incision was made. A Veress needle was placed to obtain pneumoperitoneum. An 8 mm  trocar was passed at this location, the camera was inserted. There were no injuries or abnormalities. Under direct vision, the additional ports were placed to create our standard 6 port arrangement with 2 robotic trocars in the left lower quadrant, 1 in the right lower quadrant with an additional 12 mm trocar laterally. In the right upper quadrant, a 5 mm trocar was placed. The robot was docked. The medial umbilical ligaments and urachus were sharply divided. The bladder was taken down and the space of Retzius developed. The peritoneal wings along the medial ligaments were taken down to the vas deferens bilaterally. Anterior prostatic fat was cleared off. The endopelvic fascia was exposed and incised. The levator muscles were pushed away laterally, and the apex of the prostate was exposed. The dorsal venous complex was ligated with an 0-0 vicryl suture. The anterior bladder neck was opened. The Hager catheter was lifted out. The did have a median lobe which was lifted out using a prograsp. The posterior urethra was divided. Dissection was carried down further until the vas were  identified, and each vas was dissected out with monopolar cautery and then divided. The seminal vesicles were then dissected out. The vascular supply to the seminal vesicles was clipped and divided sharply with scissors. Denonvilliers fascia was incised and the posterior plane was developed. The right vascular pedicle was then exposed and clipped with Weck clips and subsequently divided sharply. The right neurovascular bundle was further mobilized from the posterolateral base of the prostate to the apex. The patient's disease was primarily on the left, therefore I elected to perform a partial nerve sparing procedure on the left side after ligation and division of the pedicles on the left.      The apical dissection was then carried out. The urethra was exposed and divided sharply, and the prostate and seminal vesicles were then lifted out. There was oozing from both neurovascular bundles. This was superficially oversewn with 4-0 monocryl which controlled the bleeding. Hemostasis was ensured in the prostatic fossa. Next, a bilateral pelvic lymph node dissection was performed. The limits of dissection were the obturator nerve posteriorly, the pelvic side wall laterally, the external iliac vein anteriorly, and the node of Hamilton distally. The procedure was repeated on the left side without complication. Posterior reconstruction was achieved using a 3-0 V-lock suture to create an approximation of the cut edge of Denonvillier's fascia. 2 3-0 V-lock sutures were used for the urethrovesical anastomosis in a running fashion. Starting on the bladder neck side at the 5 o'clock position, the posterior suture line was cinched down securely in a watertight fashion. The anastomosis was then performed in a running fashion. An anterior bladder neck reconstruction was performed using a 3-0 V lock with a second layer using 3-0 vicryl. A new 18 Fr Hager catheter was placed. The bladder was filled with about 210 mL of sterile saline, and there was no leak. The trocars were removed and the robot undocked. The 12 mm incision was extended and the specimen removed. The fascia was closed with running 0-0 vicryl suture. All of the wounds were then irrigated and then infiltrated with 0.25% Marcaine. The skin edges were reapproximated with 4-0 Monocryl in a subcuticular fashion. The patient was awoken and transferred to recovery in good condition.      Marlee Upton MD

## 2018-10-25 NOTE — INTERVAL H&P NOTE
H&P Update:  Jose Cai was seen and examined. History and physical has been reviewed. The patient has been examined.  There have been no significant clinical changes since the completion of the originally dated History and Physical.    Robotic prostatectomy    Signed By: Bonny Mijares MD     October 25, 2018 7:43 AM

## 2018-10-25 NOTE — DISCHARGE INSTRUCTIONS
Discharge Instructions      Patient: Blayne Galan               Sex: male          DOA: 10/25/2018         YOB: 1960      Age:  60 y.o.        LOS:  LOS: 0 days     ACUTE DIAGNOSES:  Prostate cancer ([facility] Maurice Paredes    CHRONIC MEDICAL DIAGNOSES:  Problem List as of 10/25/2018 Date Reviewed: 10/25/2018          Codes Class Noted - Resolved    Malignant neoplasm of prostate [facility]) ICD-10-CM: C61  ICD-9-CM: 185  10/25/2018 - Present        Prostate cancer ([facility] ICD-10-CM: C61  ICD-9-CM: 185  10/25/2018 - Present              OPERATION: Robotic prostatectomy    DISCHARGE MEDICATIONS:   · It is important that you take the medication exactly as they are prescribed. · Keep your medication in the bottles provided by the pharmacist and keep a list of the medication names, dosages, and times to be taken in your wallet. · Do not take other medications without consulting your doctor. CATHETER: You may be discharged with a jorge catheter in your bladder. The nurses will teach you have to take care of it. You may have blood in your urine which is normal. Please drink plenty of fluids to remain hydrated. If you are passing clots or your urine becomes bright red please call the office. DIET:  You may resume a regular diet. ACTIVITY: No strenuous activity or heavy lifting (>10 lbs) for 4 weeks. Walking is strongly encouraged. You may shower. No baths or pools for 4 weeks. It is normal to not have a bowel movement as long as you continue to pass flatus. Take stool softeners to prevent constipation. ADDITIONAL INFORMATION: If you experience any of the following symptoms then please call your primary care physician or return to the emergency room if you cannot get hold of your doctor: Fever, chills, nausea, vomiting, diarrhea, change in mentation, falling, bleeding, shortness of breath. FOLLOW UP CARE:  You will follow up with Dr. Usha Fuentes for catheter removal in 1 week.    Yessica López will see you in 5 weeks. Office: [phone]        Information obtained by :  I understand that if any problems occur once I am at home I am to contact my physician. I understand and acknowledge receipt of the instructions indicated above.                                                                                                                                            Physician's or R.N.'s Signature                                                                  Date/Time                                                                                                                                              Patient or Representative Signature                                                          Date/Time

## 2018-10-25 NOTE — PROGRESS NOTES
Reason for Admission:   Prostate Cancer; malignant neoplasm of prostate                   RRAT Score:    0                 Plan for utilizing home health: To be determined                         Likelihood of Readmission:  mod                         Transition of Care Plan:  Discharge plan is to return home. Met with pt and wife with pt's consent; discussed discharge plan. Pt lives with spouse. Prior to admission, pt was independent with ADL's and able to drive to MD appointments. Own no DME. No previous use of HH or community resources. Zach Max, spouse, will transport home at discharge. CM will continue to monitor for transitional needs. ETHEL Souza, [phone]    Care Management Interventions  PCP Verified by CM:  Yes  Last Visit to PCP: 10/15/18  Mode of Transport at Discharge: Self  Transition of Care Consult (CM Consult): Discharge Planning  MyChart Signup: No  Discharge Durable Medical Equipment: No  Physical Therapy Consult: No  Occupational Therapy Consult: No  Speech Therapy Consult: No  Current Support Network: Lives with Spouse  Confirm Follow Up Transport: Family  Plan discussed with Pt/Family/Caregiver: Yes  [place] Resource Information Provided?: No  Discharge Location  Discharge Placement: Home

## 2018-10-25 NOTE — PROGRESS NOTES
conducted a pre-surgery visit with Cailin Mariee, who is a 60 y.o.,male. The  provided the following Interventions:  Initiated a relationship of care and support. Offered prayer and assurance of continued prayers on patient's behalf. Plan:  Chaplains will continue to follow and will provide pastoral care on an as needed/requested basis.  recommends bedside caregivers page  on duty if patient shows signs of acute spiritual or emotional distress.     [address] [facility] Certified [address] [place] [address]   [phone]

## 2018-10-25 NOTE — ANESTHESIA PREPROCEDURE EVALUATION
Anesthetic History   No history of anesthetic complications            Review of Systems / Medical History  Patient summary reviewed and pertinent labs reviewed    Pulmonary  Within defined limits                 Neuro/Psych   Within defined limits           Cardiovascular    Hypertension: well controlled            Pertinent negatives: CHF: Prostate.   Exercise tolerance: >4 METS     GI/Hepatic/Renal                Endo/Other        Arthritis and cancer     Other Findings              Physical Exam    Airway  Mallampati: II  TM Distance: 4 - 6 cm  Neck ROM: normal range of motion   Mouth opening: Normal     Cardiovascular  Regular rate and rhythm,  S1 and S2 normal,  no murmur, click, rub, or gallop             Dental    Dentition: Full upper dentures     Pulmonary  Breath sounds clear to auscultation               Abdominal  GI exam deferred       Other Findings            Anesthetic Plan    ASA: 3  Anesthesia type: general          Induction: Intravenous  Anesthetic plan and risks discussed with: Patient

## 2018-10-25 NOTE — ANESTHESIA POSTPROCEDURE EVALUATION
Procedure(s): 
ROBOTIC ASSISTED RADICAL PROSTATECTOMY/BILATERAL PELVIC LYMPH NODE DISECTION. Anesthesia Post Evaluation Multimodal analgesia: multimodal analgesia used between 6 hours prior to anesthesia start to PACU discharge Patient location during evaluation: bedside Patient participation: complete - patient participated Level of consciousness: awake Pain management: adequate Airway patency: patent Anesthetic complications: no 
Cardiovascular status: acceptable Respiratory status: acceptable Hydration status: acceptable Visit Vitals /80 (BP 1 Location: Right arm, BP Patient Position: At rest) Pulse 95 Temp 36.7 °C (98 °F) Resp 16 Ht 5' 9\" (1.753 m) Wt 82.2 kg (181 lb 2 oz) SpO2 98% BMI 26.75 kg/m²

## 2018-10-26 LAB
ANION GAP SERPL CALC-SCNC: 7 MMOL/L (ref 3–18)
BUN SERPL-MCNC: 7 MG/DL (ref 7–18)
BUN/CREAT SERPL: 8 (ref 12–20)
CALCIUM SERPL-MCNC: 7.6 MG/DL (ref 8.5–10.1)
CHLORIDE SERPL-SCNC: 105 MMOL/L (ref 100–108)
CO2 SERPL-SCNC: 27 MMOL/L (ref 21–32)
CREAT SERPL-MCNC: 0.86 MG/DL (ref 0.6–1.3)
ERYTHROCYTE [DISTWIDTH] IN BLOOD BY AUTOMATED COUNT: 13.2 % (ref 11.6–14.5)
GLUCOSE SERPL-MCNC: 108 MG/DL (ref 74–99)
HCT VFR BLD AUTO: 33.7 % (ref 36–48)
HGB BLD-MCNC: 11.5 G/DL (ref 13–16)
MCH RBC QN AUTO: 29.6 PG (ref 24–34)
MCHC RBC AUTO-ENTMCNC: 34.1 G/DL (ref 31–37)
MCV RBC AUTO: 86.9 FL (ref 74–97)
PLATELET # BLD AUTO: 250 K/UL (ref 135–420)
PMV BLD AUTO: 9.7 FL (ref 9.2–11.8)
POTASSIUM SERPL-SCNC: 3.4 MMOL/L (ref 3.5–5.5)
RBC # BLD AUTO: 3.88 M/UL (ref 4.7–5.5)
SODIUM SERPL-SCNC: 139 MMOL/L (ref 136–145)
WBC # BLD AUTO: 8.9 K/UL (ref 4.6–13.2)

## 2018-10-26 PROCEDURE — 74011250636 HC RX REV CODE- 250/636: Performed by: UROLOGY

## 2018-10-26 PROCEDURE — 65270000029 HC RM PRIVATE

## 2018-10-26 PROCEDURE — 74011250637 HC RX REV CODE- 250/637: Performed by: UROLOGY

## 2018-10-26 PROCEDURE — 36415 COLL VENOUS BLD VENIPUNCTURE: CPT | Performed by: UROLOGY

## 2018-10-26 PROCEDURE — 80048 BASIC METABOLIC PNL TOTAL CA: CPT | Performed by: UROLOGY

## 2018-10-26 PROCEDURE — 85027 COMPLETE CBC AUTOMATED: CPT | Performed by: UROLOGY

## 2018-10-26 RX ORDER — FAMOTIDINE 20 MG/1
20 TABLET, FILM COATED ORAL DAILY
Status: DISCONTINUED | OUTPATIENT
Start: 2018-10-26 | End: 2018-10-29 | Stop reason: HOSPADM

## 2018-10-26 RX ORDER — SODIUM CHLORIDE AND POTASSIUM CHLORIDE .9; .15 G/100ML; G/100ML
SOLUTION INTRAVENOUS CONTINUOUS
Status: DISCONTINUED | OUTPATIENT
Start: 2018-10-26 | End: 2018-10-29 | Stop reason: HOSPADM

## 2018-10-26 RX ADMIN — SODIUM CHLORIDE 125 ML/HR: 900 INJECTION, SOLUTION INTRAVENOUS at 05:21

## 2018-10-26 RX ADMIN — Medication 10 ML: at 05:17

## 2018-10-26 RX ADMIN — ASPIRIN 81 MG CHEWABLE TABLET 81 MG: 81 TABLET CHEWABLE at 08:57

## 2018-10-26 RX ADMIN — ATENOLOL 50 MG: 50 TABLET ORAL at 08:57

## 2018-10-26 RX ADMIN — HYDROMORPHONE HYDROCHLORIDE 0.4 MG: 1 INJECTION, SOLUTION INTRAMUSCULAR; INTRAVENOUS; SUBCUTANEOUS at 18:29

## 2018-10-26 RX ADMIN — OXYCODONE AND ACETAMINOPHEN 1 TABLET: 5; 325 TABLET ORAL at 14:08

## 2018-10-26 RX ADMIN — ONDANSETRON HYDROCHLORIDE 4 MG: 2 SOLUTION INTRAMUSCULAR; INTRAVENOUS at 10:36

## 2018-10-26 RX ADMIN — HEPARIN SODIUM 5000 UNITS: 5000 INJECTION, SOLUTION INTRAVENOUS; SUBCUTANEOUS at 05:15

## 2018-10-26 RX ADMIN — ONDANSETRON HYDROCHLORIDE 4 MG: 2 SOLUTION INTRAMUSCULAR; INTRAVENOUS at 18:29

## 2018-10-26 RX ADMIN — OXYCODONE AND ACETAMINOPHEN 1 TABLET: 5; 325 TABLET ORAL at 08:56

## 2018-10-26 RX ADMIN — Medication 10 ML: at 22:45

## 2018-10-26 RX ADMIN — OXYCODONE AND ACETAMINOPHEN 1 TABLET: 5; 325 TABLET ORAL at 05:14

## 2018-10-26 RX ADMIN — HEPARIN SODIUM 5000 UNITS: 5000 INJECTION, SOLUTION INTRAVENOUS; SUBCUTANEOUS at 22:40

## 2018-10-26 RX ADMIN — FAMOTIDINE 20 MG: 20 TABLET ORAL at 17:17

## 2018-10-26 RX ADMIN — Medication 10 ML: at 14:10

## 2018-10-26 RX ADMIN — DOCUSATE SODIUM 100 MG: 100 CAPSULE, LIQUID FILLED ORAL at 08:57

## 2018-10-26 RX ADMIN — HEPARIN SODIUM 5000 UNITS: 5000 INJECTION, SOLUTION INTRAVENOUS; SUBCUTANEOUS at 14:09

## 2018-10-26 RX ADMIN — DOCUSATE SODIUM 100 MG: 100 CAPSULE, LIQUID FILLED ORAL at 17:17

## 2018-10-26 RX ADMIN — OXYCODONE AND ACETAMINOPHEN 1 TABLET: 5; 325 TABLET ORAL at 22:39

## 2018-10-26 RX ADMIN — AMLODIPINE BESYLATE 10 MG: 10 TABLET ORAL at 08:56

## 2018-10-26 RX ADMIN — CLONIDINE HYDROCHLORIDE 0.1 MG: 0.1 TABLET ORAL at 17:17

## 2018-10-26 RX ADMIN — CLONIDINE HYDROCHLORIDE 0.1 MG: 0.1 TABLET ORAL at 08:56

## 2018-10-26 NOTE — ROUTINE PROCESS
Bedside and Verbal shift change report given to Percy THOMPSON (oncoming nurse) by Zackery Griffith RN (offgoing nurse). Report included the following information SBAR, Kardex, Intake/Output, MAR and Recent Results.

## 2018-10-26 NOTE — ROUTINE PROCESS
Patient vomited 200 cc after eating clear liquid dinner, call placed to Urology, Dr. Kevin Vasquez placed NPO order and an order to insert NG tube after any more episodes of vomiting during the night

## 2018-10-26 NOTE — PROGRESS NOTES
Review chart. Met with pt and wife and discussed discharge plan. Discharge plan is to return home with wife. Ana Morse, wife will transport home at discharge. CM will continue to monitor for transitional needs.   SUSI PonceN, [phone]

## 2018-10-26 NOTE — PROGRESS NOTES
Urology Progress Note        Assessment/Plan:     Patient Active Problem List   Diagnosis Code    Malignant neoplasm of prostate ([facility] [address] C61    Prostate cancer ([facility] [address] C61     Assessment:   - Prostate Cancer- [id number] Tommy 7 (4+3) in 7/12 cores involving 10-60% of each specimen - dx 6/15/18. Presenting PSA - 6.8 ng/mL                       - Bone scan and CT A/P 18: Negative for any metastasis. -  TRUS Vol 22.6 grams.  - POD # 1 s/p robotic assisted laparoscopic radical prostatectomy, right complete nerve sparing, left partial nerve sparing with bilateral pelvic lymph node dissection and anterior bladder neck reconstruction by Dr. Kayleigh Maurice on 10/25/2018   - Likely post op ileus     Plan:    - Patient with nausea, vomiting this AM  - Mildly distended abdomen with increased gas sensation in upper abdomen and belching  - Will re-order home Zantac   - Daily labs   - Patient is ambulating well in hallway, encouraged him to continue to do this. - If patient continues to vomiting with clear liquids intake, will need to be NPO.   - Potassium 3.4 - will switch fluids to 0.9% NaCl with KCl 20mEq at 100/ hour     Neha Villarreal PA-C  [facility] [place]   Pager Monday-Friday 8am-5pm [phone]  If after hours please call [phone]      Subjective:     Daily Progress Note: 10/26/2018 3:24 PM    Edmond Zaman is doing fair. He reports pain is moderately controlled. He has complaints of upper abdominal gas pains, belching, and vomiting earlier today. He is not tolerating clear liquids and ambulating without assistance. Indwelling catheter is draining well.     Objective:     Visit Vitals  /84 (BP 1 Location: Right arm, BP Patient Position: At rest)   Pulse 84   Temp 99.2 °F (37.3 °C)   Resp 18   Ht 5' 9\" (1.753 m)   Wt 82.2 kg (181 lb 2 oz)   SpO2 100%   BMI 26.75 kg/m²        Temp (24hrs), Av.8 °F (37.1 °C), Min:97.8 °F (36.6 °C), Max:99.5 °F (37.5 °C)      Intake and Output:  10/24 1901 - 10/26 0700  In: 5012.5 [P.O.:1570; I.V.:3442.5]  Out: 2400 [Urine:2150]  10/26 0701 - 10/26 1900  In: 5 [P.O.:420]  Out: -     PHYSICAL EXAMINATION:   Visit Vitals  /84 (BP 1 Location: Right arm, BP Patient Position: At rest)   Pulse 84   Temp 99.2 °F (37.3 °C)   Resp 18   Ht 5' 9\" (1.753 m)   Wt 82.2 kg (181 lb 2 oz)   SpO2 100%   BMI 26.75 kg/m²     Constitutional: Well developed, well nourished. No acute distress. HEENT: Normocephalic, Atraumatic, EOM's intact   CV: No peripheral edema noted   Respiratory: No respiratory distress or difficulties breathing   Abdomen:  Soft, non-tender, mildly distended - abdominal incision are clean, dry, without erythema, warmth, or signs of infection.  Male:   CVA tenderness: none       Hager: draining well of pink to red urine  Skin: No evidence of jaundice. Normal color  Neuro/Psych:  Alert and oriented. Affect appropriate. MSK: Normal ROM       Incision: clean, dry, no drainage    Lab/Data Review: All lab results for the last 24 hours reviewed. Labs:     Labs: Results:   Chemistry    Recent Labs     10/26/18  0310   *      K 3.4*      CO2 27   BUN 7   CREA 0.86   CA 7.6*   AGAP 7   BUCR 8*      CBC w/Diff Recent Labs     10/26/18  0310   WBC 8.9   RBC 3.88*   HGB 11.5*   HCT 33.7*         Cultures No results for input(s): CULT in the last 72 hours.   All Micro Results     None            Urinalysis Color   Date Value Ref Range Status   10/15/2018 YELLOW   Final     Appearance   Date Value Ref Range Status   10/15/2018 CLEAR   Final     Specific gravity   Date Value Ref Range Status   10/15/2018 1.013 1.005 - 1.030   Final     pH (UA)   Date Value Ref Range Status   10/15/2018 6.0 5.0 - 8.0   Final     Protein   Date Value Ref Range Status   10/15/2018 NEGATIVE  NEG mg/dL Final     Ketone   Date Value Ref Range Status   10/15/2018 NEGATIVE  NEG mg/dL Final     Bilirubin   Date Value Ref Range Status   10/15/2018 NEGATIVE NEG   Final     Blood   Date Value Ref Range Status   10/15/2018 NEGATIVE  NEG   Final     Urobilinogen   Date Value Ref Range Status   10/15/2018 0.2 0.2 - 1.0 EU/dL Final     Nitrites   Date Value Ref Range Status   10/15/2018 NEGATIVE  NEG   Final     Leukocyte Esterase   Date Value Ref Range Status   10/15/2018 NEGATIVE  NEG   Final     Potassium   Date Value Ref Range Status   10/26/2018 3.4 (L) 3.5 - 5.5 mmol/L Final     Creatinine   Date Value Ref Range Status   10/26/2018 0.86 0.6 - 1.3 MG/DL Final     BUN   Date Value Ref Range Status   10/26/2018 7 7.0 - 18 MG/DL Final     Prostate Specific Ag   Date Value Ref Range Status   04/03/2018 6.85 (H) 0.00 - 4.00 ng/ml Final     Comment:     PSA concentrations, regardless of the value, should not be interpreted as definitive evidence  for the presence or absence of prostate cancer. Prostate biopsy is required for the diagnosis of  cancer. PSA No results for input(s): PSA in the last 72 hours.    Coagulation Lab Results   Component Value Date/Time    Prothrombin time 13.2 10/15/2018 10:02 AM    INR 1.0 10/15/2018 10:02 AM    aPTT 31.8 10/15/2018 10:02 AM           No imaging this admission

## 2018-10-26 NOTE — ROUTINE PROCESS
Patient in bed AAOx4, watching TV, no respiratory distress noted. Abdomen distended BS hypoactive, denies N/V, Lap sites with topical glue dry and clean. Hager cath draining red color urine. No acute distress noted. Wife at bed side. 2230 Patient ambulated in the [address] way , tolerated well. Back in bed medicated for incisional pain.

## 2018-10-26 NOTE — PROGRESS NOTES
Problem: Falls - Risk of  Goal: *Absence of Falls  Document Srini Fall Risk and appropriate interventions in the flowsheet.   Outcome: Progressing Towards Goal  Fall Risk Interventions:  Mobility Interventions: Assess mobility with egress test, Bed/chair exit alarm, Communicate number of staff needed for ambulation/transfer, OT consult for ADLs, Patient to call before getting OOB, PT Consult for mobility concerns         Medication Interventions: Assess postural VS orthostatic hypotension, Bed/chair exit alarm, Evaluate medications/consider consulting pharmacy    Elimination Interventions: Bed/chair exit alarm, Call light in reach, Patient to call for help with toileting needs, Toilet paper/wipes in reach

## 2018-10-27 LAB
ANION GAP SERPL CALC-SCNC: 9 MMOL/L (ref 3–18)
BUN SERPL-MCNC: 13 MG/DL (ref 7–18)
BUN/CREAT SERPL: 15 (ref 12–20)
CALCIUM SERPL-MCNC: 7.9 MG/DL (ref 8.5–10.1)
CHLORIDE SERPL-SCNC: 106 MMOL/L (ref 100–108)
CO2 SERPL-SCNC: 26 MMOL/L (ref 21–32)
CREAT SERPL-MCNC: 0.86 MG/DL (ref 0.6–1.3)
ERYTHROCYTE [DISTWIDTH] IN BLOOD BY AUTOMATED COUNT: 13.7 % (ref 11.6–14.5)
GLUCOSE SERPL-MCNC: 109 MG/DL (ref 74–99)
HCT VFR BLD AUTO: 34.7 % (ref 36–48)
HGB BLD-MCNC: 11.8 G/DL (ref 13–16)
MCH RBC QN AUTO: 30.3 PG (ref 24–34)
MCHC RBC AUTO-ENTMCNC: 34 G/DL (ref 31–37)
MCV RBC AUTO: 89 FL (ref 74–97)
PLATELET # BLD AUTO: 251 K/UL (ref 135–420)
PMV BLD AUTO: 9.7 FL (ref 9.2–11.8)
POTASSIUM SERPL-SCNC: 3.6 MMOL/L (ref 3.5–5.5)
RBC # BLD AUTO: 3.9 M/UL (ref 4.7–5.5)
SODIUM SERPL-SCNC: 141 MMOL/L (ref 136–145)
WBC # BLD AUTO: 8.2 K/UL (ref 4.6–13.2)

## 2018-10-27 PROCEDURE — 74011250636 HC RX REV CODE- 250/636: Performed by: UROLOGY

## 2018-10-27 PROCEDURE — 74011250637 HC RX REV CODE- 250/637: Performed by: UROLOGY

## 2018-10-27 PROCEDURE — 65270000029 HC RM PRIVATE

## 2018-10-27 PROCEDURE — 85027 COMPLETE CBC AUTOMATED: CPT | Performed by: UROLOGY

## 2018-10-27 PROCEDURE — 36415 COLL VENOUS BLD VENIPUNCTURE: CPT | Performed by: UROLOGY

## 2018-10-27 PROCEDURE — 80048 BASIC METABOLIC PNL TOTAL CA: CPT | Performed by: UROLOGY

## 2018-10-27 RX ADMIN — OXYCODONE AND ACETAMINOPHEN 1 TABLET: 5; 325 TABLET ORAL at 04:15

## 2018-10-27 RX ADMIN — OXYCODONE AND ACETAMINOPHEN 1 TABLET: 5; 325 TABLET ORAL at 10:55

## 2018-10-27 RX ADMIN — HEPARIN SODIUM 5000 UNITS: 5000 INJECTION, SOLUTION INTRAVENOUS; SUBCUTANEOUS at 14:36

## 2018-10-27 RX ADMIN — Medication 10 ML: at 05:36

## 2018-10-27 RX ADMIN — CLONIDINE HYDROCHLORIDE 0.1 MG: 0.1 TABLET ORAL at 09:06

## 2018-10-27 RX ADMIN — SODIUM CHLORIDE AND POTASSIUM CHLORIDE: 9; 1.49 INJECTION, SOLUTION INTRAVENOUS at 04:40

## 2018-10-27 RX ADMIN — HEPARIN SODIUM 5000 UNITS: 5000 INJECTION, SOLUTION INTRAVENOUS; SUBCUTANEOUS at 05:36

## 2018-10-27 RX ADMIN — OXYCODONE AND ACETAMINOPHEN 1 TABLET: 5; 325 TABLET ORAL at 21:10

## 2018-10-27 RX ADMIN — Medication 10 ML: at 21:14

## 2018-10-27 RX ADMIN — AMLODIPINE BESYLATE 10 MG: 10 TABLET ORAL at 09:06

## 2018-10-27 RX ADMIN — DOCUSATE SODIUM 100 MG: 100 CAPSULE, LIQUID FILLED ORAL at 09:06

## 2018-10-27 RX ADMIN — ASPIRIN 81 MG CHEWABLE TABLET 81 MG: 81 TABLET CHEWABLE at 09:06

## 2018-10-27 RX ADMIN — CLONIDINE HYDROCHLORIDE 0.1 MG: 0.1 TABLET ORAL at 17:32

## 2018-10-27 RX ADMIN — DOCUSATE SODIUM 100 MG: 100 CAPSULE, LIQUID FILLED ORAL at 17:32

## 2018-10-27 RX ADMIN — ATENOLOL 50 MG: 50 TABLET ORAL at 09:06

## 2018-10-27 RX ADMIN — OXYCODONE AND ACETAMINOPHEN 1 TABLET: 5; 325 TABLET ORAL at 16:10

## 2018-10-27 RX ADMIN — HEPARIN SODIUM 5000 UNITS: 5000 INJECTION, SOLUTION INTRAVENOUS; SUBCUTANEOUS at 21:10

## 2018-10-27 RX ADMIN — FAMOTIDINE 20 MG: 20 TABLET ORAL at 09:06

## 2018-10-27 NOTE — PROGRESS NOTES
Problem: Falls - Risk of  Goal: *Absence of Falls  Document Srini Fall Risk and appropriate interventions in the flowsheet. Outcome: Progressing Towards Goal  Fall Risk Interventions:  Mobility Interventions: Bed/chair exit alarm         Medication Interventions: Bed/chair exit alarm    Elimination Interventions: Call light in reach             Problem: Pressure Injury - Risk of  Goal: *Prevention of pressure injury  Document Edmund Scale and appropriate interventions in the flowsheet.   Outcome: Progressing Towards Goal  Pressure Injury Interventions:  Sensory Interventions: Assess changes in LOC    Moisture Interventions: Maintain skin hydration (lotion/cream)    Activity Interventions: Pressure redistribution bed/mattress(bed type)    Mobility Interventions: HOB 30 degrees or less    Nutrition Interventions: Document food/fluid/supplement intake    Friction and Shear Interventions: HOB 30 degrees or less

## 2018-10-27 NOTE — PROGRESS NOTES
Urology Hospital Progress Note    HD#:   3  POD#:  2  Antibiotics:  periop    ASSESSMENT:     1. Prostate Cancer- [id number] 7 (4+3) in 7/12 cores involving 10-60% of each specimen - dx 6/15/18. Presenting PSA - 6.8 ng/mL                       - Bone scan and CT A/P 7/18/18: Negative for any metastasis.            -  TRUS Vol 22.6 grams. 2. Likely post op ileus     PLAN:     -stable electrolytes  -renal function stable  -passing some flatus. Continue NPO for now. Slow improvement.  -If continues to improve, will plan for advance diet in the AM (10/28)    SUBJECTIVE:     CC: Prostate cancer    HPI: He is starting to pass flatus. Abdomen remains distended.     ECOG PS: 0    OBJECTIVE:     Visit Vitals  /76 (BP 1 Location: Left arm, BP Patient Position: At rest)   Pulse 83   Temp 98.2 °F (36.8 °C)   Resp 16   Ht 5' 9\" (1.753 m)   Wt 181 lb 2 oz (82.2 kg)   SpO2 94%   BMI 26.75 kg/m²       Gen: NAD   Pulm: Equal respiratory effort bilaterally; clear   CV: regular rate   Abd: soft, nontender, incision c/d/i; moderate distension   : clear and yellow  Ext: No clubbing, cyanosis or edema   Neuro: Grossly intact   Skin: warm and dry     Recent Results (from the past 24 hour(s))   METABOLIC PANEL, BASIC    Collection Time: 10/27/18  3:50 AM   Result Value Ref Range    Sodium 141 136 - 145 mmol/L    Potassium 3.6 3.5 - 5.5 mmol/L    Chloride 106 100 - 108 mmol/L    CO2 26 21 - 32 mmol/L    Anion gap 9 3.0 - 18 mmol/L    Glucose 109 (H) 74 - 99 mg/dL    BUN 13 7.0 - 18 MG/DL    Creatinine 0.86 0.6 - 1.3 MG/DL    BUN/Creatinine ratio 15 12 - 20      GFR est AA >60 >60 ml/min/1.73m2    GFR est non-AA >60 >60 ml/min/1.73m2    Calcium 7.9 (L) 8.5 - 10.1 MG/DL   CBC W/O DIFF    Collection Time: 10/27/18  3:50 AM   Result Value Ref Range    WBC 8.2 4.6 - 13.2 K/uL    RBC 3.90 (L) 4.70 - 5.50 M/uL    HGB 11.8 (L) 13.0 - 16.0 g/dL    HCT 34.7 (L) 36.0 - 48.0 %    MCV 89.0 74.0 - 97.0 FL    MCH 30.3 24.0 - 34.0 PG MCHC 34.0 31.0 - 37.0 g/dL    RDW 13.7 11.6 - 14.5 %    PLATELET 073 707 - 568 K/uL    MPV 9.7 9.2 - 11.8 FL         Myles Anderson MD, [address]   Urologic Oncology  [facility] [address] [place] [address]     of Urology  Department of Urology  Mary Butler.  [place], [address] [facility], [address] [place] [address]     Pager:  [phone]  Office:  [phone]

## 2018-10-27 NOTE — ROUTINE PROCESS
Bedside and Verbal shift change report given to [address] [place] [address] (oncoming nurse) by Judith Tiwari RN (offgoing nurse). Report included the following information SBAR, Kardex, MAR and Recent Results.     SITUATION:    Code Status: Full Code   Reason for Admission: Prostate cancer ([facility] [address] day: 2   Problem List:       Hospital Problems  Date Reviewed: 10/25/2018          Codes Class Noted POA    Malignant neoplasm of prostate [facility]) ICD-10-CM: C61  ICD-9-CM: 895  10/25/2018 Unknown        Prostate cancer ([facility] [address] ICD-10-CM: C61  ICD-9-CM: 185  10/25/2018 Unknown              BACKGROUND:    Past Medical History:   Past Medical History:   Diagnosis Date    Arthritis     Eczema     ED (erectile dysfunction)     Elevated PSA     Essential hypertension     Gout     Prostate cancer ([facility] [address]     Renal colic          Patient taking anticoagulants no     ASSESSMENT:    Changes in Assessment Throughout Shift: no     Patient has Central Line: no Reasons if yes: No   Patient has Hager Cath: no Reasons if yes: No      Last Vitals:     Vitals:    10/26/18 1122 10/26/18 1524 10/26/18 1915 10/27/18 0003   BP: 155/84 143/86 139/84 130/80   Pulse: 84 97 (!) 104 (!) 106   Resp: 18 17 18 18   Temp: 99.2 °F (37.3 °C) 98.6 °F (37 °C) 99.4 °F (37.4 °C) 98.6 °F (37 °C)   SpO2: 100% 100% 96% 96%   Weight:       Height:            IV and DRAINS (will only show if present)   Peripheral IV 10/25/18 Left Hand-Site Assessment: Clean, dry, & intact  Peripheral IV 10/25/18 Right Antecubital-Site Assessment: Clean, dry, & intact     WOUND (if present)   Wound Type:  none   Dressing present Dressing Present : Yes   Wound Concerns/Notes:  none     PAIN    Pain Assessment    Pain Intensity 1: 0 (10/27/18 0003)    Pain Location 1: Abdomen    Pain Intervention(s) 1: Medication (see MAR)    Patient Stated Pain Goal: 0  o Interventions for Pain:  none  o Intervention effective: no  o Time of last intervention: 0700   o Reassessment Completed: no      Last 3 Weights:  Last 3 Recorded Weights in this Encounter    10/18/18 0957 10/25/18 0658   Weight: 83.5 kg (184 lb) 82.2 kg (181 lb 2 oz)     Weight change:      INTAKE/OUPUT    Current Shift: No intake/output data recorded. Last three shifts: 10/25 0701 - 10/26 1900  In: 5432.5 [P.O.:1990; I.V.:3442.5]  Out: 3350 [Urine:3100]     LAB RESULTS     Recent Labs     10/26/18  0310   WBC 8.9   HGB 11.5*   HCT 33.7*           Recent Labs     10/26/18  0310      K 3.4*   *   BUN 7   CREA 0.86   CA 7.6*       RECOMMENDATIONS AND DISCHARGE PLANNING     1. Pending tests/procedures/ Plan of Care or Other Needs: TBD     2. Discharge plan for patient and Needs/Barriers: TBD    3. Estimated Discharge Date: TBD Posted on Whiteboard in Patients Room: no      4. The patient's care plan was reviewed with the oncoming nurse. \"HEALS\" SAFETY CHECK      Fall Risk    Total Score: 3    Safety Measures: Safety Measures: Bed/Chair alarm on, Bed/Chair-Wheels locked, Bed in low position, Call light within reach, Fall prevention (comment)    A safety check occurred in the patient's room between off going nurse and oncoming nurse listed above. The safety check included the below items  Area Items   H  High Alert Medications - Verify all high alert medication drips (heparin, PCA, etc.)   E  Equipment - Suction is set up for ALL patients (with yanker)  - Red plugs utilized for all equipment (IV pumps, etc.)  - WOWs wiped down at end of shift.  - Room stocked with oxygen, suction, and other unit-specific supplies   A  Alarms - Bed alarm is set for fall risk patients  - Ensure chair alarm is in place and activated if patient is up in a chair   L  Lines - Check IV for any infiltration  - Hager bag is empty if patient has a Hager   - Tubing and IV bags are labeled   S  Safety   - Room is clean, patient is clean, and equipment is clean. - Hallways are clear from equipment besides carts.    - Fall bracelet on for fall risk patients  - Ensure room is clear and free of clutter  - Suction is set up for ALL patients (with aracelis)  - Hallways are clear from equipment besides carts.    - Isolation precautions followed, supplies available outside room, sign posted     Marky De Leon RN

## 2018-10-27 NOTE — ROUTINE PROCESS
0151: Pt resting in bed in stable condition. Hager draining well. Day shift nurse stated that the pt is NPO due to nausea and vomiting. Pain and scheduled medication given. Pt denies nausea or vomiting. Wife is by the bedside. Will continue to monitor the pt. Pt passing gas and belching after ambulating several times in the hallway. Pt given po meds twice for moderate  pain. No complains after treatment. Will continue to monitor the pt.

## 2018-10-28 LAB
ANION GAP SERPL CALC-SCNC: 7 MMOL/L (ref 3–18)
BUN SERPL-MCNC: 17 MG/DL (ref 7–18)
BUN/CREAT SERPL: 22 (ref 12–20)
CALCIUM SERPL-MCNC: 8.1 MG/DL (ref 8.5–10.1)
CHLORIDE SERPL-SCNC: 108 MMOL/L (ref 100–108)
CO2 SERPL-SCNC: 28 MMOL/L (ref 21–32)
CREAT SERPL-MCNC: 0.79 MG/DL (ref 0.6–1.3)
ERYTHROCYTE [DISTWIDTH] IN BLOOD BY AUTOMATED COUNT: 13.5 % (ref 11.6–14.5)
GLUCOSE SERPL-MCNC: 93 MG/DL (ref 74–99)
HCT VFR BLD AUTO: 32.6 % (ref 36–48)
HGB BLD-MCNC: 10.6 G/DL (ref 13–16)
MCH RBC QN AUTO: 29.2 PG (ref 24–34)
MCHC RBC AUTO-ENTMCNC: 32.5 G/DL (ref 31–37)
MCV RBC AUTO: 89.8 FL (ref 74–97)
PLATELET # BLD AUTO: 257 K/UL (ref 135–420)
PMV BLD AUTO: 9.7 FL (ref 9.2–11.8)
POTASSIUM SERPL-SCNC: 3.5 MMOL/L (ref 3.5–5.5)
RBC # BLD AUTO: 3.63 M/UL (ref 4.7–5.5)
SODIUM SERPL-SCNC: 143 MMOL/L (ref 136–145)
WBC # BLD AUTO: 3.9 K/UL (ref 4.6–13.2)

## 2018-10-28 PROCEDURE — 65270000029 HC RM PRIVATE

## 2018-10-28 PROCEDURE — 36415 COLL VENOUS BLD VENIPUNCTURE: CPT | Performed by: UROLOGY

## 2018-10-28 PROCEDURE — 85027 COMPLETE CBC AUTOMATED: CPT | Performed by: UROLOGY

## 2018-10-28 PROCEDURE — 74011250636 HC RX REV CODE- 250/636: Performed by: UROLOGY

## 2018-10-28 PROCEDURE — 80048 BASIC METABOLIC PNL TOTAL CA: CPT | Performed by: UROLOGY

## 2018-10-28 PROCEDURE — 74011250637 HC RX REV CODE- 250/637: Performed by: UROLOGY

## 2018-10-28 RX ADMIN — DOCUSATE SODIUM 100 MG: 100 CAPSULE, LIQUID FILLED ORAL at 18:00

## 2018-10-28 RX ADMIN — FAMOTIDINE 20 MG: 20 TABLET ORAL at 08:25

## 2018-10-28 RX ADMIN — HEPARIN SODIUM 5000 UNITS: 5000 INJECTION, SOLUTION INTRAVENOUS; SUBCUTANEOUS at 14:00

## 2018-10-28 RX ADMIN — OXYCODONE AND ACETAMINOPHEN 1 TABLET: 5; 325 TABLET ORAL at 21:54

## 2018-10-28 RX ADMIN — AMLODIPINE BESYLATE 10 MG: 10 TABLET ORAL at 08:25

## 2018-10-28 RX ADMIN — CLONIDINE HYDROCHLORIDE 0.1 MG: 0.1 TABLET ORAL at 18:00

## 2018-10-28 RX ADMIN — HEPARIN SODIUM 5000 UNITS: 5000 INJECTION, SOLUTION INTRAVENOUS; SUBCUTANEOUS at 21:53

## 2018-10-28 RX ADMIN — SODIUM CHLORIDE AND POTASSIUM CHLORIDE: 9; 1.49 INJECTION, SOLUTION INTRAVENOUS at 21:55

## 2018-10-28 RX ADMIN — ASPIRIN 81 MG CHEWABLE TABLET 81 MG: 81 TABLET CHEWABLE at 08:25

## 2018-10-28 RX ADMIN — ZOLPIDEM TARTRATE 5 MG: 5 TABLET ORAL at 21:54

## 2018-10-28 RX ADMIN — OXYCODONE AND ACETAMINOPHEN 1 TABLET: 5; 325 TABLET ORAL at 05:37

## 2018-10-28 RX ADMIN — OXYCODONE AND ACETAMINOPHEN 1 TABLET: 5; 325 TABLET ORAL at 14:00

## 2018-10-28 RX ADMIN — OXYCODONE AND ACETAMINOPHEN 1 TABLET: 5; 325 TABLET ORAL at 18:00

## 2018-10-28 RX ADMIN — HEPARIN SODIUM 5000 UNITS: 5000 INJECTION, SOLUTION INTRAVENOUS; SUBCUTANEOUS at 05:37

## 2018-10-28 RX ADMIN — OXYCODONE AND ACETAMINOPHEN 1 TABLET: 5; 325 TABLET ORAL at 09:10

## 2018-10-28 RX ADMIN — Medication 10 ML: at 21:54

## 2018-10-28 RX ADMIN — DOCUSATE SODIUM 100 MG: 100 CAPSULE, LIQUID FILLED ORAL at 08:25

## 2018-10-28 RX ADMIN — CLONIDINE HYDROCHLORIDE 0.1 MG: 0.1 TABLET ORAL at 08:25

## 2018-10-28 RX ADMIN — ATENOLOL 50 MG: 50 TABLET ORAL at 08:25

## 2018-10-28 NOTE — ROUTINE PROCESS
Bedside and Verbal shift change report given to [address] [place] [address] (oncoming nurse) by Danny Argueta RN (offgoing nurse). Report included the following information SBAR, Kardex, MAR and Recent Results.     SITUATION:    Code Status: Full Code   Reason for Admission: Prostate cancer ([facility] [address] day: 3   Problem List:       Hospital Problems  Date Reviewed: 10/25/2018          Codes Class Noted POA    Malignant neoplasm of prostate [facility]) ICD-10-CM: C61  ICD-9-CM: 321  10/25/2018 Unknown        Prostate cancer ([facility] [address] ICD-10-CM: C61  ICD-9-CM: 185  10/25/2018 Unknown              BACKGROUND:    Past Medical History:   Past Medical History:   Diagnosis Date    Arthritis     Eczema     ED (erectile dysfunction)     Elevated PSA     Essential hypertension     Gout     Prostate cancer ([facility] [address]     Renal colic          Patient taking anticoagulants no     ASSESSMENT:    Changes in Assessment Throughout Shift: No     Patient has Central Line: no Reasons if yes: no   Patient has Hager Cath: no Reasons if yes: No      Last Vitals:     Vitals:    10/27/18 0758 10/27/18 1113 10/27/18 1536 10/27/18 1947   BP: 128/77 119/76 121/73 124/81   Pulse: 90 83 85 91   Resp: 16 16 16 18   Temp: 98.8 °F (37.1 °C) 98.2 °F (36.8 °C) 98.2 °F (36.8 °C) 98.6 °F (37 °C)   SpO2: 97% 94% 94% 95%   Weight:       Height:            IV and DRAINS (will only show if present)   Peripheral IV 10/25/18 Left Hand-Site Assessment: Clean, dry, & intact  Peripheral IV 10/25/18 Right Antecubital-Site Assessment: Clean, dry, & intact     WOUND (if present)   Wound Type:  none   Dressing present Dressing Present : Yes   Wound Concerns/Notes:  none     PAIN    Pain Assessment    Pain Intensity 1: 7 (10/27/18 2112)    Pain Location 1: Abdomen    Pain Intervention(s) 1: Medication (see MAR)    Patient Stated Pain Goal: 0  o Interventions for Pain:  none  o Intervention effective: no  o Time of last intervention: 0700   o Reassessment Completed: no      Last 3 Weights:  Last 3 Recorded Weights in this Encounter    10/18/18 0957 10/25/18 0658   Weight: 83.5 kg (184 lb) 82.2 kg (181 lb 2 oz)     Weight change:      INTAKE/OUPUT    Current Shift: 10/27 1901 - 10/28 0700  In: 1461.7 [I.V.:1461.7]  Out: 300 [Urine:300]    Last three shifts: 10/26 0701 - 10/27 1900  In: 420 [P.O.:420]  Out: 1575 [Urine:1575]     LAB RESULTS     Recent Labs     10/27/18  0350 10/26/18  0310   WBC 8.2 8.9   HGB 11.8* 11.5*   HCT 34.7* 33.7*    250        Recent Labs     10/27/18  0350 10/26/18  0310    139   K 3.6 3.4*   * 108*   BUN 13 7   CREA 0.86 0.86   CA 7.9* 7.6*       RECOMMENDATIONS AND DISCHARGE PLANNING     1. Pending tests/procedures/ Plan of Care or Other Needs: TBD     2. Discharge plan for patient and Needs/Barriers: TBD    3. Estimated Discharge Date: TBD Posted on Whiteboard in Patients Room: no      4. The patient's care plan was reviewed with the oncoming nurse. \"HEALS\" SAFETY CHECK      Fall Risk    Total Score: 2    Safety Measures: Safety Measures: Bed/Chair alarm on, Bed/Chair-Wheels locked, Bed in low position, Call light within reach, Fall prevention (comment)    A safety check occurred in the patient's room between off going nurse and oncoming nurse listed above.     The safety check included the below items  Area Items   H  High Alert Medications - Verify all high alert medication drips (heparin, PCA, etc.)   E  Equipment - Suction is set up for ALL patients (with yanker)  - Red plugs utilized for all equipment (IV pumps, etc.)  - WOWs wiped down at end of shift.  - Room stocked with oxygen, suction, and other unit-specific supplies   A  Alarms - Bed alarm is set for fall risk patients  - Ensure chair alarm is in place and activated if patient is up in a chair   L  Lines - Check IV for any infiltration  - Hager bag is empty if patient has a Hager   - Tubing and IV bags are labeled   S  Safety   - Room is clean, patient is clean, and equipment is clean. - Hallways are clear from equipment besides carts. - Fall bracelet on for fall risk patients  - Ensure room is clear and free of clutter  - Suction is set up for ALL patients (with aracelis)  - Hallways are clear from equipment besides carts.    - Isolation precautions followed, supplies available outside room, sign posted     Lindsey Sanchez RN

## 2018-10-28 NOTE — PROGRESS NOTES
Urology Hospital Progress Note    HD#:   4  POD#:  3  Antibiotics:  periop    ASSESSMENT:     1. Pathologic stage [id number] (of 6)M0R0 Kidder 4+3 (tertiary 5) prostate adenocarcinoma s/p RARP 10/18.   -50% of prostate involved by tumor    Current creatinine:   0.8 (eGFR: >60)    2. Post op ileus   -passing flatus and BM   -remains quite distended but improved from yesterday    PLAN:     -advance diet today. Self regulation. Continue gum chewing.  -continue mobilization  -labs stable  -once tolerating a regular diet, will plan for disposition. SUBJECTIVE:     CC: Prostate cancer    HPI: Overnight, passing more flatus and having a couple of bowel movements. Still feels bloated and states that even with ice chips, he has become bloated.     ECOG PS: 1      OBJECTIVE:     Visit Vitals  /75 (BP 1 Location: Left arm, BP Patient Position: At rest)   Pulse 88   Temp 99.4 °F (37.4 °C)   Resp 17   Ht 5' 9\" (1.753 m)   Wt 181 lb 2 oz (82.2 kg)   SpO2 96%   BMI 26.75 kg/m²       Gen: NAD   Pulm: CTA B no w/r/r; Equal respiratory effort bilaterally   CV: regular rate , s1 s2 no m/r/g  Abd: softer than yesterday; remains quite distended; NABS  : jorge is clear and yellow  Ext: No clubbing, cyanosis or edema   Neuro: Grossly intact   Skin: warm and dry     Recent Results (from the past 24 hour(s))   METABOLIC PANEL, BASIC    Collection Time: 10/28/18  3:42 AM   Result Value Ref Range    Sodium 143 136 - 145 mmol/L    Potassium 3.5 3.5 - 5.5 mmol/L    Chloride 108 100 - 108 mmol/L    CO2 28 21 - 32 mmol/L    Anion gap 7 3.0 - 18 mmol/L    Glucose 93 74 - 99 mg/dL    BUN 17 7.0 - 18 MG/DL    Creatinine 0.79 0.6 - 1.3 MG/DL    BUN/Creatinine ratio 22 (H) 12 - 20      GFR est AA >60 >60 ml/min/1.73m2    GFR est non-AA >60 >60 ml/min/1.73m2    Calcium 8.1 (L) 8.5 - 10.1 MG/DL   CBC W/O DIFF    Collection Time: 10/28/18  3:42 AM   Result Value Ref Range    WBC 3.9 (L) 4.6 - 13.2 K/uL    RBC 3.63 (L) 4.70 - 5.50 M/uL    HGB 10.6 (L) 13.0 - 16.0 g/dL    HCT 32.6 (L) 36.0 - 48.0 %    MCV 89.8 74.0 - 97.0 FL    MCH 29.2 24.0 - 34.0 PG    MCHC 32.5 31.0 - 37.0 g/dL    RDW 13.5 11.6 - 14.5 %    PLATELET 130 480 - 842 K/uL    MPV 9.7 9.2 - 11.8 FL         Myles Alfred MD, ite Rob    Urologic Oncology  [facility] [place]     of Urology  Department of Urology  Stan Regalado.  [place], [address] [place], [place]     Pager:  [phone]  Office:  [phone]

## 2018-10-28 NOTE — ROUTINE PROCESS
0003: Pt resting on a chair after ambulating in the hallway. Pt given pain medication . Denies nausea or vomiting. No fever or signs of infection. Remains NPO except for ice chips and medication. Passing flatus and had a large bowel movement. Will continue to monitor the pt.  9961: Pt had another bowel  Movement and the abdomen  remains distended. Will continue to monitor the pt.

## 2018-10-29 VITALS
SYSTOLIC BLOOD PRESSURE: 118 MMHG | HEART RATE: 76 BPM | OXYGEN SATURATION: 99 % | RESPIRATION RATE: 18 BRPM | HEIGHT: 69 IN | TEMPERATURE: 99.1 F | DIASTOLIC BLOOD PRESSURE: 66 MMHG | BODY MASS INDEX: 26.83 KG/M2 | WEIGHT: 181.13 LBS

## 2018-10-29 LAB
ANION GAP SERPL CALC-SCNC: 8 MMOL/L (ref 3–18)
BUN SERPL-MCNC: 14 MG/DL (ref 7–18)
BUN/CREAT SERPL: 19 (ref 12–20)
CALCIUM SERPL-MCNC: 7.8 MG/DL (ref 8.5–10.1)
CHLORIDE SERPL-SCNC: 109 MMOL/L (ref 100–108)
CO2 SERPL-SCNC: 25 MMOL/L (ref 21–32)
CREAT SERPL-MCNC: 0.72 MG/DL (ref 0.6–1.3)
ERYTHROCYTE [DISTWIDTH] IN BLOOD BY AUTOMATED COUNT: 13.3 % (ref 11.6–14.5)
GLUCOSE SERPL-MCNC: 104 MG/DL (ref 74–99)
HCT VFR BLD AUTO: 28.5 % (ref 36–48)
HGB BLD-MCNC: 9.7 G/DL (ref 13–16)
MCH RBC QN AUTO: 30.3 PG (ref 24–34)
MCHC RBC AUTO-ENTMCNC: 34 G/DL (ref 31–37)
MCV RBC AUTO: 89.1 FL (ref 74–97)
PLATELET # BLD AUTO: 261 K/UL (ref 135–420)
PMV BLD AUTO: 9.3 FL (ref 9.2–11.8)
POTASSIUM SERPL-SCNC: 3.4 MMOL/L (ref 3.5–5.5)
RBC # BLD AUTO: 3.2 M/UL (ref 4.7–5.5)
SODIUM SERPL-SCNC: 142 MMOL/L (ref 136–145)
WBC # BLD AUTO: 3.8 K/UL (ref 4.6–13.2)

## 2018-10-29 PROCEDURE — 36415 COLL VENOUS BLD VENIPUNCTURE: CPT | Performed by: UROLOGY

## 2018-10-29 PROCEDURE — 85027 COMPLETE CBC AUTOMATED: CPT | Performed by: UROLOGY

## 2018-10-29 PROCEDURE — 74011250637 HC RX REV CODE- 250/637: Performed by: UROLOGY

## 2018-10-29 PROCEDURE — 74011250636 HC RX REV CODE- 250/636: Performed by: UROLOGY

## 2018-10-29 PROCEDURE — 80048 BASIC METABOLIC PNL TOTAL CA: CPT | Performed by: UROLOGY

## 2018-10-29 RX ADMIN — OXYCODONE AND ACETAMINOPHEN 1 TABLET: 5; 325 TABLET ORAL at 08:41

## 2018-10-29 RX ADMIN — DOCUSATE SODIUM 100 MG: 100 CAPSULE, LIQUID FILLED ORAL at 09:00

## 2018-10-29 RX ADMIN — HEPARIN SODIUM 5000 UNITS: 5000 INJECTION, SOLUTION INTRAVENOUS; SUBCUTANEOUS at 05:56

## 2018-10-29 RX ADMIN — AMLODIPINE BESYLATE 10 MG: 10 TABLET ORAL at 08:42

## 2018-10-29 RX ADMIN — ASPIRIN 81 MG CHEWABLE TABLET 81 MG: 81 TABLET CHEWABLE at 08:41

## 2018-10-29 RX ADMIN — CLONIDINE HYDROCHLORIDE 0.1 MG: 0.1 TABLET ORAL at 08:42

## 2018-10-29 RX ADMIN — Medication 10 ML: at 05:57

## 2018-10-29 RX ADMIN — OXYCODONE AND ACETAMINOPHEN 1 TABLET: 5; 325 TABLET ORAL at 03:36

## 2018-10-29 RX ADMIN — ATENOLOL 50 MG: 50 TABLET ORAL at 08:42

## 2018-10-29 RX ADMIN — FAMOTIDINE 20 MG: 20 TABLET ORAL at 08:42

## 2018-10-29 NOTE — PROGRESS NOTES
I have reviewed discharge instructions with the patient and spouse. The patient and spouse verbalized understanding. Educated wife on how to empty urine leg bag.

## 2018-10-29 NOTE — ROUTINE PROCESS
Bedside and Verbal shift change report given to *** (oncoming nurse) by Carol Chino RN (offgoing nurse). Report included the following information SBAR, Kardex, MAR and Recent Results. SITUATION:  
? Code Status: Full Code 
? Reason for Admission: Prostate cancer ([facility] [address] Cb Older ? Hospital day: 4 
? Problem List:  
   
Hospital Problems  Date Reviewed: 10/25/2018 Codes Class Noted POA Malignant neoplasm of prostate [facility]) ICD-10-CM: G62 ICD-9-CM: 185  10/25/2018 Unknown Prostate cancer [facility]) ICD-10-CM: S13 ICD-9-CM: 185  10/25/2018 Unknown BACKGROUND:  
 Past Medical History:  
Past Medical History:  
Diagnosis Date  Arthritis  Eczema  ED (erectile dysfunction)  Elevated PSA  Essential hypertension  Gout  Prostate cancer ([facility] [address]  Renal colic Patient taking anticoagulants no ASSESSMENT:  
? Changes in Assessment Throughout Shift: No 
 
? Patient has Central Line: no Reasons if yes: no 
? Patient has Hager Cath: no Reasons if yes: no  
 
? Last Vitals: 
  
Vitals:  
 10/28/18 0718 10/28/18 1119 10/28/18 1525 10/28/18 1938 BP: 134/76 116/69 136/78 116/68 Pulse: 88 75 76 80 Resp: 18 18 18 16 Temp: 98.2 °F (36.8 °C) 97.9 °F (36.6 °C) 97.8 °F (36.6 °C) 99.3 °F (37.4 °C) SpO2: 96% 96% 98% 96% Weight:      
Height:      
 
 
? IV and DRAINS (will only show if present) Peripheral IV 10/25/18 Left Hand-Site Assessment: Clean, dry, & intact Peripheral IV 10/25/18 Right Antecubital-Site Assessment: Clean, dry, & intact ? WOUND (if present) Wound Type:  none Dressing present Dressing Present : Yes Wound Concerns/Notes:  none ? PAIN Pain Assessment Pain Intensity 1: 7 (10/28/18 2159) Pain Location 1: Abdomen Pain Intervention(s) 1: Medication (see MAR)   Patient Stated Pain Goal: 0 
o Interventions for Pain:  none 
o Intervention effective: no 
o Time of last intervention: 0700  
o Reassessment Completed: no  
 
 ? Last 3 Weights: 
Last 3 Recorded Weights in this Encounter 10/18/18 0957 10/25/18 0879 Weight: 83.5 kg (184 lb) 82.2 kg (181 lb 2 oz) Weight change: ? INTAKE/OUPUT Current Shift: No intake/output data recorded. Last three shifts: 10/27 0701 - 10/28 1900 In: 1461.7 [I.V.:1461.7] Out: 875 Gamaliel Truong ? LAB RESULTS Recent Labs 10/28/18 
6726 10/27/18 
0350 10/26/18 
0310 WBC 3.9* 8.2 8.9 HGB 10.6* 11.8* 11.5* HCT 32.6* 34.7* 33.7*  
 251 250 Recent Labs 10/28/18 
8134 10/27/18 
0350 10/26/18 
0310  141 139  
K 3.5 3.6 3.4*  
GLU 93 109* 108* BUN 17 13 7 CREA 0.79 0.86 0.86 CA 8.1* 7.9* 7.6* RECOMMENDATIONS AND DISCHARGE PLANNING 1. Pending tests/procedures/ Plan of Care or Other Needs: TBD 2. Discharge plan for patient and Needs/Barriers: TBD 3. Estimated Discharge Date: TBD Posted on Whiteboard in Patients Room: no   
 
4. The patient's care plan was reviewed with the oncoming nurse. \"HEALS\" SAFETY CHECK Fall Risk Total Score: 2 Safety Measures: Safety Measures: Bed/Chair alarm on, Bed/Chair-Wheels locked, Bed in low position, Call light within reach, Fall prevention (comment) A safety check occurred in the patient's room between off going nurse and oncoming nurse listed above. The safety check included the below items Area Items H High Alert Medications ? Verify all high alert medication drips (heparin, PCA, etc.) E Equipment ? Suction is set up for ALL patients (with yanker) ? Red plugs utilized for all equipment (IV pumps, etc.) ? WOWs wiped down at end of shift. ? Room stocked with oxygen, suction, and other unit-specific supplies A Alarms ? Bed alarm is set for fall risk patients ? Ensure chair alarm is in place and activated if patient is up in a chair L Lines ? Check IV for any infiltration ? Hager bag is empty if patient has a Hager ? Tubing and IV bags are labeled Daune Siomara Safety ? Room is clean, patient is clean, and equipment is clean. ? Hallways are clear from equipment besides carts. ? Fall bracelet on for fall risk patients ? Ensure room is clear and free of clutter ? Suction is set up for ALL patients (with aracelis) ? Hallways are clear from equipment besides carts. ? Isolation precautions followed, supplies available outside room, sign posted Bjorn Choe RN

## 2018-10-29 NOTE — DISCHARGE SUMMARY
Discharge Summary   Admit Date: 10/25/2018  Discharge Date: 10/29/2018      Patient ID:  Katelyn Doshi  60 y.o.  1960    No chief complaint on file. Patient Active Problem List    Diagnosis Date Noted    Malignant neoplasm of prostate ([facility] [address] 10/25/2018    Prostate cancer ([facility] [address] 10/25/2018       Discharge Diagnosis:  Prostate cancer     Current Discharge Medication List      START taking these medications    Details   oxyCODONE-acetaminophen (PERCOCET) 5-325 mg per tablet Take 1-2 Tabs by mouth every four (4) hours as needed for Pain. Max Daily Amount: 12 Tabs. Qty: 20 Tab, Refills: 0      docusate sodium (COLACE) 100 mg capsule Take 1 Cap by mouth two (2) times a day for 20 days. Qty: 40 Cap, Refills: 0      enoxaparin (LOVENOX) 40 mg/0.4 mL 0.4 mL by SubCUTAneous route daily. Qty: 26 Syringe, Refills: 0         CONTINUE these medications which have NOT CHANGED    Details   sildenafil, antihypertensive, (REVATIO) 20 mg tablet Take 1-5 po as needed  Qty: 90 Tab, Refills: 6      amLODIPine (NORVASC) 10 mg tablet Take 10 mg by mouth daily. cloNIDine HCl (CATAPRES) 0.1 mg tablet Take 0.1 mg by mouth two (2) times a day. etodolac (LODINE) 400 mg tablet Take 400 mg by mouth two (2) times a day. atenolol (TENORMIN) 50 mg tablet Take 50 mg by mouth daily. aspirin 81 mg chewable tablet Take 81 mg by mouth daily. colchicine (MITIGARE) 0.6 mg capsule Take one tablet by mouth when you return home. Qty: 1 Cap, Refills: 0      HYDROcodone-acetaminophen (NORCO) 5-325 mg per tablet Take 1 Tab by mouth every four (4) hours as needed for Pain. Max Daily Amount: 6 Tabs. Qty: 6 Tab, Refills: 0    Associated Diagnoses: Acute gout, unspecified cause, unspecified site             Operative Procedures:  OPERATION PERFORMED:  1. Robotic-assisted laparoscopic radical prostatectomy, right complete nerve sparing, left partial nerve sparing.   2. Robotic-assisted laparoscopic bilateral pelvic lymph node dissection. 3. Anterior Bladder neck reconstruction        Consultants:  None    Hospital Course:  Uneventful procedure. Patient developed postop ileus and remained in hospital for 4 days till resumption of bowel function and tolerating diet. Pain control, jorge draining well. Home w/ jorge and stable for d/c today     Physical Exam on Discharge:  Visit Vitals  /70 (BP 1 Location: Right arm, BP Patient Position: At rest)   Pulse 78   Temp 98.9 °F (37.2 °C)   Resp 17   Ht 5' 9\" (1.753 m)   Wt 181 lb 2 oz (82.2 kg)   SpO2 98%   BMI 26.75 kg/m²       Constitutional: WDWN, Pleasant and appropriate affect, No acute distress. CV:  No peripheral swelling noted  Respiratory: No respiratory distress or difficulties  Abdomen: Soft, less distended, mild inc tenderness,    Male:    PENIS:Urethral meatus normal in location and size. No urethral discharge. Jorge in place, draining well. Skin: No jaundice. Neuro/Psych:  Alert and oriented x 3. Affect appropriate.        Most Recent BMP and CBC:    Recent Labs     10/29/18  0300   BUN 14      CO2 25     Recent Labs     10/29/18  0300   WBC 3.8*   RBC 3.20*   HCT 28.5*   MCV 89.1   MCH 30.3   MCHC 34.0   RDW 13.3         Condition at discharge: Afebrile  Ambulating  Eating, Drinking, Voiding  Stable    Disposition:  Home                         Rx: Colace, percocet, levonox                         Followup: Dr Florencio Lerner 1 week for jorge removal     Julia Turcios MD    Office: [phone]  Pager: [phone]        [unfilled]    PCP:  Osvaldo Calvo MD

## 2018-10-29 NOTE — PROGRESS NOTES
Discharge order noted for today. Met with pt and spouse are agreeable to the transition plan today. No needs identified from CM. Transport has been arranged with Lynn Rubin, spouse.     Chris Ron, BSN, [phone]

## 2018-10-29 NOTE — ROUTINE PROCESS
0120: Pt sleeping. Stable and has been ambulating in the hallway. Was able to have a BM. Pain and scheduled medication given. Abdomen remains distended despite having BM. Will continue to monitor the pt.

## 2019-03-18 ENCOUNTER — DOCUMENTATION ONLY (OUTPATIENT)
Dept: ONCOLOGY | Age: 59
End: 2019-03-18

## 2019-04-15 ENCOUNTER — DOCUMENTATION ONLY (OUTPATIENT)
Dept: ONCOLOGY | Age: 59
End: 2019-04-15

## 2019-05-22 NOTE — PROGRESS NOTES
Pt returned request for call, Survivorship Care Plan reviewed. All questions answered,  pt verbalized understanding. Pt reminded of Prostate Support Group and encouraged to attend. Pt also encouraged to call back if he has any further questions.

## 2021-08-03 PROBLEM — C61 MALIGNANT NEOPLASM OF PROSTATE (HCC): Status: RESOLVED | Noted: 2018-10-25 | Resolved: 2021-08-03

## (undated) DEVICE — APPLICATOR SEAL FLOSEAL 5MM+ --

## (undated) DEVICE — TIP COVER ACCESSORY

## (undated) DEVICE — SOLUTION IRRIG 1000ML H2O STRL BLT

## (undated) DEVICE — NEEDLE HYPO 25GA L1.5IN BVL ORIENTED ECLIPSE

## (undated) DEVICE — COLUMN DRAPE

## (undated) DEVICE — SEAL UNIV 5-8MM DISP BX/10 -- DA VINCI XI - SNGL USE

## (undated) DEVICE — FLOSEAL HEMOSTATIC MATRIX, 10 ML: Brand: FLOSEAL

## (undated) DEVICE — GLOVE SURG SZ 8 L11.77IN FNGR THK9.8MIL STRW LTX POLYMER

## (undated) DEVICE — CARTRIDGE CLP LIG HEMLOK GRN --

## (undated) DEVICE — APPLICATOR BNDG 1MM ADH PREMIERPRO EXOFIN

## (undated) DEVICE — 3M™ IOBAN™ 2 ANTIMICROBIAL INCISE DRAPE 6651EZ: Brand: IOBAN™ 2

## (undated) DEVICE — ELECTRO LUBE IS A SINGLE PATIENT USE DEVICE THAT IS INTENDED TO BE USED ON ELECTROSURGICAL ELECTRODES TO REDUCE STICKING.: Brand: KEY SURGICAL ELECTRO LUBE

## (undated) DEVICE — 3M™ TRANSPORE™ TAPE 1527-3: Brand: 3M™ TRANSPORE™

## (undated) DEVICE — SUTURE VCRL SZ 3-0 L27IN ABSRB UD L17MM RB-1 1/2 CIR J215H

## (undated) DEVICE — 3M™ DURAPORE™ SURGICAL TAPE 1538-3, 3 INCH X 10 YARD (7,5CM X 9,1M), 4 ROLLS/BOX: Brand: 3M™ DURAPORE™

## (undated) DEVICE — SUTURE MCRYL SZ 4-0 L18IN ABSRB UD L19MM PS-2 3/8 CIR PRIM Y496G

## (undated) DEVICE — REM POLYHESIVE ADULT PATIENT RETURN ELECTRODE: Brand: VALLEYLAB

## (undated) DEVICE — DRAPE TOWEL: Brand: CONVERTORS

## (undated) DEVICE — SOFT SILICONE HYDROCELLULAR SACRUM DRESSING WITH LOCK AWAY LAYER: Brand: ALLEVYN LIFE SACRUM (LARGE) PACK OF 10

## (undated) DEVICE — BAG SPEC RETRV 275ML 10ML DISPOSABLE RELIACATCH

## (undated) DEVICE — CATH URETH FOL 2W SH 20FRX5 --

## (undated) DEVICE — PAD BD CONVOLUTED FOAM

## (undated) DEVICE — CLIP LIG ABSRB HEM-LOK LG PUR --

## (undated) DEVICE — LUB SURG MEDC STRL 2OZ TUBE MC -- MEDICHOICE

## (undated) DEVICE — 40580 - THE PINK PAD - ADVANCED TRENDELENBURG POSITIONING KIT: Brand: 40580 - THE PINK PAD - ADVANCED TRENDELENBURG POSITIONING KIT

## (undated) DEVICE — Device

## (undated) DEVICE — TELFA NON-ADHERENT ABSORBENT DRESSING: Brand: TELFA

## (undated) DEVICE — SYSTEM INF CTRL

## (undated) DEVICE — SUTURE V-LOC 180 SZ 3-0 L6IN ABSRB VLT CV-23 L17MM 1/2 CIR VLOCM0804

## (undated) DEVICE — SYR IRR CATH TIP LR ADPT 70ML -- CONVERT TO ITEM 363120

## (undated) DEVICE — ARM DRAPE

## (undated) DEVICE — VISUALIZATION SYSTEM: Brand: CLEARIFY

## (undated) DEVICE — BLADELESS OPTICAL TROCAR WITH FIXATION CANNULA: Brand: VERSAPORT

## (undated) DEVICE — SUTURE V-LOC 90 3-0 L6IN ABSRB UD CV-23 L17MM 1/2 CIR VLOCM1904

## (undated) DEVICE — TRAY CATH OD16FR SIL URIN M STATLOK STBL DEV SURSTP

## (undated) DEVICE — CATHETER URETH 18FR BLLN 5CC SIL ALLY W/ SIL HYDRGEL 2 W F

## (undated) DEVICE — SUTURE VCRL SZ 0 L18IN ABSRB UD POLYGLACTIN 910 BRAID TIE J912G

## (undated) DEVICE — STERILE POLYISOPRENE POWDER-FREE SURGICAL GLOVES: Brand: PROTEXIS

## (undated) DEVICE — SUTURE VCRL SZ 0 L36IN ABSRB UD L36MM CT-1 1/2 CIR J946H

## (undated) DEVICE — SUTURE MCRYL SZ 4-0 L27IN ABSRB UD RB-1 L17MM 1/2 CIR Y214H

## (undated) DEVICE — INSTRMT SET WND CLSR SUT PASS --

## (undated) DEVICE — KENDALL SCD EXPRESS SLEEVES, KNEE LENGTH, MEDIUM: Brand: KENDALL SCD

## (undated) DEVICE — TRI-LUMEN FILTERED TUBE SET WITH ACTIVATED CHARCOAL FILTER: Brand: AIRSEAL

## (undated) DEVICE — (D)PREP SKN CHLRAPRP APPL 26ML -- CONVERT TO ITEM 371833